# Patient Record
Sex: MALE | Race: WHITE
[De-identification: names, ages, dates, MRNs, and addresses within clinical notes are randomized per-mention and may not be internally consistent; named-entity substitution may affect disease eponyms.]

---

## 2018-02-11 ENCOUNTER — HOSPITAL ENCOUNTER (INPATIENT)
Dept: HOSPITAL 54 - ER | Age: 68
LOS: 2 days | Discharge: HOME | DRG: 537 | End: 2018-02-13
Attending: INTERNAL MEDICINE | Admitting: INTERNAL MEDICINE
Payer: MEDICARE

## 2018-02-11 VITALS — WEIGHT: 240 LBS | BODY MASS INDEX: 34.36 KG/M2 | HEIGHT: 70 IN

## 2018-02-11 VITALS — SYSTOLIC BLOOD PRESSURE: 122 MMHG | DIASTOLIC BLOOD PRESSURE: 68 MMHG

## 2018-02-11 DIAGNOSIS — F17.200: ICD-10-CM

## 2018-02-11 DIAGNOSIS — J44.9: ICD-10-CM

## 2018-02-11 DIAGNOSIS — Z79.4: ICD-10-CM

## 2018-02-11 DIAGNOSIS — E11.9: ICD-10-CM

## 2018-02-11 DIAGNOSIS — E66.9: ICD-10-CM
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
## 2018-02-11 DIAGNOSIS — S73.005A: Primary | ICD-10-CM

## 2018-02-11 DIAGNOSIS — F41.9: ICD-10-CM
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
## 2018-02-11 DIAGNOSIS — R65.10: ICD-10-CM

## 2018-02-11 DIAGNOSIS — E83.42: ICD-10-CM

## 2018-02-11 DIAGNOSIS — F32.9: ICD-10-CM
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
## 2018-02-11 DIAGNOSIS — I10: ICD-10-CM

## 2018-02-11 DIAGNOSIS — Z79.899: ICD-10-CM

## 2018-02-11 DIAGNOSIS — F10.10: ICD-10-CM
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
## 2018-02-11 LAB
ALBUMIN SERPL BCP-MCNC: 3.4 G/DL (ref 3.4–5)
ALP SERPL-CCNC: 126 U/L (ref 46–116)
ALT SERPL W P-5'-P-CCNC: 75 U/L (ref 12–78)
APTT PPP: 22 SEC (ref 23–34)
AST SERPL W P-5'-P-CCNC: 54 U/L (ref 15–37)
BASOPHILS # BLD AUTO: 0 /CMM (ref 0–0.2)
BASOPHILS NFR BLD AUTO: 0.2 % (ref 0–2)
BILIRUB DIRECT SERPL-MCNC: 0.2 MG/DL (ref 0–0.2)
BILIRUB SERPL-MCNC: 0.4 MG/DL (ref 0.2–1)
BUN SERPL-MCNC: 22 MG/DL (ref 7–18)
CALCIUM SERPL-MCNC: 9 MG/DL (ref 8.5–10.1)
CHLORIDE SERPL-SCNC: 105 MMOL/L (ref 98–107)
CO2 SERPL-SCNC: 31 MMOL/L (ref 21–32)
CREAT SERPL-MCNC: 0.9 MG/DL (ref 0.6–1.3)
EOSINOPHIL # BLD AUTO: 0.2 /CMM (ref 0–0.7)
EOSINOPHIL NFR BLD AUTO: 1.3 % (ref 0–6)
EOSINOPHIL NFR BLD MANUAL: 1 % (ref 0–4)
GLUCOSE SERPL-MCNC: 110 MG/DL (ref 74–106)
HCT VFR BLD AUTO: 40 % (ref 39–51)
HGB BLD-MCNC: 14.1 G/DL (ref 13.5–17.5)
INR PPP: 0.88 (ref 0.85–1.15)
LYMPHOCYTES NFR BLD AUTO: 1 /CMM (ref 0.8–4.8)
LYMPHOCYTES NFR BLD AUTO: 7.3 % (ref 20–44)
LYMPHOCYTES NFR BLD MANUAL: 10 % (ref 16–48)
MCH RBC QN AUTO: 33 PG (ref 26–33)
MCHC RBC AUTO-ENTMCNC: 35 G/DL (ref 31–36)
MCV RBC AUTO: 93 FL (ref 80–96)
MONOCYTES NFR BLD AUTO: 1.1 /CMM (ref 0.1–1.3)
MONOCYTES NFR BLD AUTO: 8.4 % (ref 2–12)
MONOCYTES NFR BLD MANUAL: 11 % (ref 0–11)
NEUTROPHILS # BLD AUTO: 11.2 /CMM (ref 1.8–8.9)
NEUTROPHILS NFR BLD AUTO: 82.8 % (ref 43–81)
NEUTS BAND NFR BLD MANUAL: 3 % (ref 0–5)
NEUTS SEG NFR BLD MANUAL: 75 % (ref 42–76)
PLATELET # BLD AUTO: 111 /CMM (ref 150–450)
POTASSIUM SERPL-SCNC: 3.6 MMOL/L (ref 3.5–5.1)
PROT SERPL-MCNC: 8 G/DL (ref 6.4–8.2)
RBC # BLD AUTO: 4.33 MIL/UL (ref 4.5–6)
RDW COEFFICIENT OF VARIATION: 12.4 (ref 11.5–15)
SODIUM SERPL-SCNC: 139 MMOL/L (ref 136–145)
TROPONIN I SERPL-MCNC: < 0.017 NG/ML (ref 0–0.06)
WBC NRBC COR # BLD AUTO: 13.5 K/UL (ref 4.3–11)

## 2018-02-11 PROCEDURE — 0SSBXZZ REPOSITION LEFT HIP JOINT, EXTERNAL APPROACH: ICD-10-PCS

## 2018-02-11 PROCEDURE — A4606 OXYGEN PROBE USED W OXIMETER: HCPCS

## 2018-02-11 PROCEDURE — Z7610: HCPCS

## 2018-02-11 NOTE — NUR
pt rec'd to er  via ems   lives at sober living  fac  . friends  called [id number]  
pt couldnt get up out of bathrrom    very dizzy .   this has happened befiore  
pt stated  oriented x2  . stted taking  beer with xanax 2 mg po  and  norco 325 
 cant remembe rhow many  . very dirty .  iv strted 18 left ac  labs drawn sent 
to lab./  pt use to use  herioin    just drinks now     pt fell aleeep  on  
monitor  or2 sats 84 ra  n/c 3l  AWAITING EVALUATION BY ER PROVIDER.

## 2018-02-11 NOTE — NUR
MS RN NOTES

RECEIVED REPORT FROM ER NURSE ED.PATIENT WAS BROUGHT UP TO THE UNIT ON A GURNEY. SLEEPING, 
EASILY AROUSED WITH VERBAL STIMULI. ORIENTED X3 -4. ON 2 L OF O2 VIA NASAL CANNULA. NO 
APPARENT DISTRESS OR DISCOMFORT NOTED AT THIS TIME. DENIES PAIN. LEFT AC HL #18, PATENT AND 
INTACT, FLASHED WITH NS. PATIENT WITH ABDUCTOR PILLOW AND LLE IMMOBILIZER. PATIENT WAS MADE 
COMFORTABLE IN BED. BELONGINGS AND VALUABLES IN PLACE PER INITIAL LIST. SAFETY MEASURES IN 
PLACE: ID BAND ON, BED IN LOW LOCKED POSITION WITH SIDE RAILS UP x2, CALL LIGHT WITHIN EASY 
REACH. WILL CONTINUE TO MONITOR.

## 2018-02-12 VITALS — SYSTOLIC BLOOD PRESSURE: 125 MMHG | DIASTOLIC BLOOD PRESSURE: 74 MMHG

## 2018-02-12 VITALS — DIASTOLIC BLOOD PRESSURE: 72 MMHG | SYSTOLIC BLOOD PRESSURE: 137 MMHG

## 2018-02-12 VITALS — DIASTOLIC BLOOD PRESSURE: 50 MMHG | SYSTOLIC BLOOD PRESSURE: 129 MMHG

## 2018-02-12 LAB
BASOPHILS # BLD AUTO: 0 /CMM (ref 0–0.2)
BASOPHILS NFR BLD AUTO: 0 % (ref 0–2)
BUN SERPL-MCNC: 10 MG/DL (ref 7–18)
CALCIUM SERPL-MCNC: 8.7 MG/DL (ref 8.5–10.1)
CHLORIDE SERPL-SCNC: 107 MMOL/L (ref 98–107)
CO2 SERPL-SCNC: 28 MMOL/L (ref 21–32)
CREAT SERPL-MCNC: 0.7 MG/DL (ref 0.6–1.3)
EOSINOPHIL # BLD AUTO: 0.1 /CMM (ref 0–0.7)
EOSINOPHIL NFR BLD AUTO: 0.8 % (ref 0–6)
GLUCOSE SERPL-MCNC: 296 MG/DL (ref 74–106)
HCT VFR BLD AUTO: 36 % (ref 39–51)
HGB BLD-MCNC: 12.8 G/DL (ref 13.5–17.5)
LYMPHOCYTES NFR BLD AUTO: 1 /CMM (ref 0.8–4.8)
LYMPHOCYTES NFR BLD AUTO: 7.4 % (ref 20–44)
MAGNESIUM SERPL-MCNC: 1.6 MG/DL (ref 1.8–2.4)
MCH RBC QN AUTO: 33 PG (ref 26–33)
MCHC RBC AUTO-ENTMCNC: 35 G/DL (ref 31–36)
MCV RBC AUTO: 94 FL (ref 80–96)
MONOCYTES NFR BLD AUTO: 0.9 /CMM (ref 0.1–1.3)
MONOCYTES NFR BLD AUTO: 7 % (ref 2–12)
NEUTROPHILS # BLD AUTO: 11.5 /CMM (ref 1.8–8.9)
NEUTROPHILS NFR BLD AUTO: 84.8 % (ref 43–81)
PHOSPHATE SERPL-MCNC: 2.4 MG/DL (ref 2.5–4.9)
PLATELET # BLD AUTO: 100 /CMM (ref 150–450)
POTASSIUM SERPL-SCNC: 4.1 MMOL/L (ref 3.5–5.1)
RBC # BLD AUTO: 3.86 MIL/UL (ref 4.5–6)
RDW COEFFICIENT OF VARIATION: 13.6 (ref 11.5–15)
SODIUM SERPL-SCNC: 142 MMOL/L (ref 136–145)
WBC NRBC COR # BLD AUTO: 13.6 K/UL (ref 4.3–11)

## 2018-02-12 RX ADMIN — Medication PRN MG: at 08:42

## 2018-02-12 RX ADMIN — MAGNESIUM SULFATE IN DEXTROSE SCH MLS/HR: 10 INJECTION, SOLUTION INTRAVENOUS at 13:45

## 2018-02-12 RX ADMIN — MAGNESIUM SULFATE IN DEXTROSE SCH MLS/HR: 10 INJECTION, SOLUTION INTRAVENOUS at 12:45

## 2018-02-12 RX ADMIN — ALPRAZOLAM SCH MG: 1 TABLET ORAL at 21:27

## 2018-02-12 RX ADMIN — Medication PRN MG: at 12:37

## 2018-02-12 RX ADMIN — Medication PRN MG: at 04:43

## 2018-02-12 NOTE — NUR
MS RN CLOSING NOTES

PATIENT AWAKE IN BED, ALERT ORIENTED X3 -4. ON 2 L OF O2 VIA NASAL CANNULA. NO APPARENT 
DISTRESS OR DISCOMFORT NOTED AT THIS TIME. DENIES PAIN. LEFT AC HL #18, PATENT AND INTACT. 
PATIENT WITH ABDUCTOR PILLOW AND LLE IMMOBILIZER. PATIENT KEPT CLEAN AND COMFORTABLE IN BED. 
ALL NEEDS ATTENDED. SAFETY MEASURES IN PLACE: ID BAND ON, BED IN LOW LOCKED POSITION WITH 
SIDE RAILS UP x2, CALL LIGHT WITHIN EASY REACH. WILL CONTINUE TO MONITOR.

## 2018-02-12 NOTE — NUR
Social service consult requested by Dr. Bocanegra for readmission score of 11 and pt. from sober 
living. SW referred pt. to  [facility] for [facility].

## 2018-02-12 NOTE — NUR
MS/RN   End note



Patient refusing all oral medications until Dr Naylor has entered home medications, 
patient continues to state that he is in pain and needs his regular medications. Patient 
also appears to be withdrawing from alcohol. Hip precautions reinforced, stated that he was 
aware.

Safety measures in place, call light within reach, will continue to monitor and endorse to 
night shift.

## 2018-02-12 NOTE — NUR
MS/RN   Pain medication



Complaining of pain to left hip 8/10, medication administered as ordered. Will monitor 
effectiveness.

## 2018-02-12 NOTE — NUR
RN NOTES



RECEIVED PT OUT OF BED, WALKING WITH FWW, VERY UPSET AND AGITATED  BECAUSE HIS MEDICATIONS 
WERE NOT ORDERED YET BY THE DOCTOR. THE PT EVEN THROW THE FWW TO HIS FRUSTRATION. SPOKE TO 
THE PT AND CALM HIM DOWN, TOLD HIM WILL CALL THE DOCTOR RIGHT NOW TO GET THE ORDER. ASSISTED 
PT BACK TO BED, AND WILL UPDATE WITH HIS MEDICATIONS.

## 2018-02-12 NOTE — NUR
RN NOTES



TRAZADONE 400MG PUT ON HOLD BECAUSE PT IS SO SLEEPY WITH XANAX AND OXYCODONE. WILL CONTINUE 
TO MONITOR PT.

## 2018-02-12 NOTE — NUR
MS/RN   Patient received



Patient received from night shift.

All needs attended, remains with abduction pillow to prevent hip from further dislocating. 
Left foot warm to touch with full sensation. 

Call light within [facility], will continue to monitor and ensure safety.

## 2018-02-12 NOTE — NUR
MS/RN   S/B Ortho



Seen by ortho - patient remains non compliant, stable from ortho stand point. Follow up with 
Dr Reddy at [facility].

## 2018-02-12 NOTE — NUR
MS/RN   Refused magnesium



Patient refusing magnesium replacement, stated that if he can't get his home medication then 
he doesn't want anything else. Will inform MD.

## 2018-02-13 VITALS — DIASTOLIC BLOOD PRESSURE: 88 MMHG | SYSTOLIC BLOOD PRESSURE: 127 MMHG

## 2018-02-13 VITALS — SYSTOLIC BLOOD PRESSURE: 137 MMHG | DIASTOLIC BLOOD PRESSURE: 72 MMHG

## 2018-02-13 VITALS — SYSTOLIC BLOOD PRESSURE: 127 MMHG | DIASTOLIC BLOOD PRESSURE: 88 MMHG

## 2018-02-13 LAB
BUN SERPL-MCNC: 10 MG/DL (ref 7–18)
CALCIUM SERPL-MCNC: 9.1 MG/DL (ref 8.5–10.1)
CHLORIDE SERPL-SCNC: 105 MMOL/L (ref 98–107)
CO2 SERPL-SCNC: 30 MMOL/L (ref 21–32)
CREAT SERPL-MCNC: 0.7 MG/DL (ref 0.6–1.3)
GLUCOSE SERPL-MCNC: 176 MG/DL (ref 74–106)
MAGNESIUM SERPL-MCNC: 1.6 MG/DL (ref 1.8–2.4)
PHOSPHATE SERPL-MCNC: 3 MG/DL (ref 2.5–4.9)
POTASSIUM SERPL-SCNC: 4.4 MMOL/L (ref 3.5–5.1)
SODIUM SERPL-SCNC: 143 MMOL/L (ref 136–145)

## 2018-02-13 RX ADMIN — ALPRAZOLAM SCH MG: 1 TABLET ORAL at 12:12

## 2018-02-13 RX ADMIN — MAGNESIUM SULFATE IN DEXTROSE SCH MLS/HR: 10 INJECTION, SOLUTION INTRAVENOUS at 11:12

## 2018-02-13 RX ADMIN — MAGNESIUM SULFATE IN DEXTROSE SCH MLS/HR: 10 INJECTION, SOLUTION INTRAVENOUS at 12:12

## 2018-02-13 RX ADMIN — ALPRAZOLAM SCH MG: 1 TABLET ORAL at 09:00

## 2018-02-13 RX ADMIN — Medication SCH MG: at 08:56

## 2018-02-13 RX ADMIN — CYCLOBENZAPRINE HYDROCHLORIDE SCH MG: 10 TABLET, FILM COATED ORAL at 08:17

## 2018-02-13 RX ADMIN — Medication SCH MG: at 12:12

## 2018-02-13 RX ADMIN — CYCLOBENZAPRINE HYDROCHLORIDE SCH MG: 10 TABLET, FILM COATED ORAL at 12:11

## 2018-02-13 NOTE — NUR
RN NOTES



PT REFUSING FOR ABDUCTOR PILLOW ON WHILE IN BED AND CAN WALK WITH OUT FWW WITHOUT PAIN. 
VERBALIZING WANTING TO BE DISCHARGE HOME. NO SIGNIFICANT CHANGE IN CONDITION NOTED. PAIN 
CONTROLLED. ENDORSED TO MORNING RN FOR CONTINUITY OF CARE.

## 2018-02-13 NOTE — NUR
MS/RN   S/B Carlton Decker



Seen by Carlton Decker - patient to be discharged to home today and follow up with ortho. No 
prescriptions needed.

## 2018-02-13 NOTE — NUR
MS/RN   Discharge



Patient discharged to home in stable condition.

All personal belongings signed for on personal property list. Education provided to patient 
regarding the importance of following up with ortho MD at [facility], also 
concerning hip precautions, patient stated understanding of both.

Escorted to main lobby by CNA, son providing transport.



Both heplock and name bands already removed.

## 2018-02-13 NOTE — NUR
MS/RN   



Seen by  - patient again stating wish to be discharged to home today and not to 
rehab. Stated that he has all equipment needed at home from previous surgery.

## 2018-02-13 NOTE — NUR
MS/RN   Patient received



Patient received from night shift.

Unhappy about care received by MD, stating that he had no right to withhold his medications 
from him. Expressed that he would be leaving today, and would arrange his own rehab.

Safety measures in placed, attempted to reinforce hip precautions, but refusing to listen.

Will continue to monitor and ensure safety.

## 2021-03-07 ENCOUNTER — HOSPITAL ENCOUNTER (INPATIENT)
Dept: HOSPITAL 54 - ER | Age: 71
LOS: 10 days | Discharge: NURSING, REHAB OR LONG-TERM CARE | DRG: 468 | End: 2021-03-17
Attending: INTERNAL MEDICINE | Admitting: INTERNAL MEDICINE
Payer: MEDICARE

## 2021-03-07 VITALS — DIASTOLIC BLOOD PRESSURE: 117 MMHG | SYSTOLIC BLOOD PRESSURE: 169 MMHG

## 2021-03-07 VITALS — WEIGHT: 189 LBS | HEIGHT: 70 IN | BODY MASS INDEX: 27.06 KG/M2

## 2021-03-07 DIAGNOSIS — I10: ICD-10-CM

## 2021-03-07 DIAGNOSIS — E11.65: ICD-10-CM

## 2021-03-07 DIAGNOSIS — G89.29: ICD-10-CM

## 2021-03-07 DIAGNOSIS — N40.0: ICD-10-CM

## 2021-03-07 DIAGNOSIS — T84.021A: Primary | ICD-10-CM
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
## 2021-03-07 DIAGNOSIS — F17.210: ICD-10-CM

## 2021-03-07 DIAGNOSIS — Z20.822: ICD-10-CM

## 2021-03-07 DIAGNOSIS — E78.5: ICD-10-CM
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
## 2021-03-07 DIAGNOSIS — Z79.84: ICD-10-CM

## 2021-03-07 DIAGNOSIS — Z79.891: ICD-10-CM

## 2021-03-07 LAB
BASOPHILS # BLD AUTO: 0.1 /CMM (ref 0–0.2)
BASOPHILS NFR BLD AUTO: 0.8 % (ref 0–2)
BUN SERPL-MCNC: 14 MG/DL (ref 7–18)
CALCIUM SERPL-MCNC: 8.8 MG/DL (ref 8.5–10.1)
CHLORIDE SERPL-SCNC: 98 MMOL/L (ref 98–107)
CO2 SERPL-SCNC: 30 MMOL/L (ref 21–32)
CREAT SERPL-MCNC: 1.1 MG/DL (ref 0.6–1.3)
EOSINOPHIL NFR BLD AUTO: 3.6 % (ref 0–6)
EOSINOPHIL NFR BLD MANUAL: 2 % (ref 0–4)
GLUCOSE SERPL-MCNC: 459 MG/DL (ref 74–106)
HCT VFR BLD AUTO: 40 % (ref 39–51)
HGB BLD-MCNC: 13.7 G/DL (ref 13.5–17.5)
LYMPHOCYTES NFR BLD AUTO: 1.6 /CMM (ref 0.8–4.8)
LYMPHOCYTES NFR BLD AUTO: 23.2 % (ref 20–44)
LYMPHOCYTES NFR BLD MANUAL: 22 % (ref 16–48)
MCHC RBC AUTO-ENTMCNC: 34 G/DL (ref 31–36)
MCV RBC AUTO: 94 FL (ref 80–96)
MONOCYTES NFR BLD AUTO: 0.6 /CMM (ref 0.1–1.3)
MONOCYTES NFR BLD AUTO: 8.7 % (ref 2–12)
MONOCYTES NFR BLD MANUAL: 7 % (ref 0–11)
NEUTROPHILS # BLD AUTO: 4.5 /CMM (ref 1.8–8.9)
NEUTROPHILS NFR BLD AUTO: 63.7 % (ref 43–81)
NEUTS SEG NFR BLD MANUAL: 69 % (ref 42–76)
PLATELET # BLD AUTO: 84 /CMM (ref 150–450)
POTASSIUM SERPL-SCNC: 4.3 MMOL/L (ref 3.5–5.1)
RBC # BLD AUTO: 4.28 MIL/UL (ref 4.5–6)
SODIUM SERPL-SCNC: 132 MMOL/L (ref 136–145)
WBC NRBC COR # BLD AUTO: 7.1 K/UL (ref 4.3–11)

## 2021-03-07 PROCEDURE — A4217 STERILE WATER/SALINE, 500 ML: HCPCS

## 2021-03-07 PROCEDURE — 0SJBXZZ INSPECTION OF LEFT HIP JOINT, EXTERNAL APPROACH: ICD-10-PCS | Performed by: SPECIALIST

## 2021-03-07 PROCEDURE — G0378 HOSPITAL OBSERVATION PER HR: HCPCS

## 2021-03-07 PROCEDURE — G0500 MOD SEDAT ENDO SERVICE >5YRS: HCPCS

## 2021-03-07 PROCEDURE — A6209 FOAM DRSG <=16 SQ IN W/O BDR: HCPCS

## 2021-03-07 PROCEDURE — C9803 HOPD COVID-19 SPEC COLLECT: HCPCS

## 2021-03-07 RX ADMIN — ALPRAZOLAM SCH MG: 1 TABLET ORAL at 17:13

## 2021-03-07 RX ADMIN — CYCLOBENZAPRINE HYDROCHLORIDE SCH MG: 10 TABLET, FILM COATED ORAL at 17:13

## 2021-03-07 RX ADMIN — Medication SCH EACH: at 18:28

## 2021-03-07 RX ADMIN — TAMSULOSIN HYDROCHLORIDE SCH MG: 0.4 CAPSULE ORAL at 22:28

## 2021-03-07 RX ADMIN — TAMSULOSIN HYDROCHLORIDE SCH MG: 0.4 CAPSULE ORAL at 22:00

## 2021-03-07 RX ADMIN — Medication PRN MG: at 16:47

## 2021-03-07 RX ADMIN — Medication SCH EACH: at 22:18

## 2021-03-07 RX ADMIN — Medication SCH MG: at 22:29

## 2021-03-07 RX ADMIN — ATORVASTATIN CALCIUM SCH MG: 10 TABLET, FILM COATED ORAL at 22:29

## 2021-03-07 RX ADMIN — ATORVASTATIN CALCIUM SCH MG: 10 TABLET, FILM COATED ORAL at 22:00

## 2021-03-07 RX ADMIN — HUMAN INSULIN PRN UNIT: 100 INJECTION, SOLUTION SUBCUTANEOUS at 22:28

## 2021-03-07 RX ADMIN — TRAZODONE HYDROCHLORIDE SCH MG: 50 TABLET ORAL at 22:00

## 2021-03-07 RX ADMIN — INSULIN HUMAN PRN UNIT: 100 INJECTION, SOLUTION PARENTERAL at 18:36

## 2021-03-07 NOTE — NUR
oxcontin held pt is arousavble to touch but falling asleep mid conversations. rr is 14. will 
cont to monito.r

## 2021-03-07 NOTE — NUR
PT [id number] GLF LEFT LEG "GIVES IN" & FELL PER EMS REPORT, -KO. PT IS AAOX4, 
NOT IN RESPIRATORY DISTRESS, HOOKED TO CARDIAC MONITOR, KEPT RESTED AND 
COMFORTABLE. WILL CONTINUE TO MONITOR.

## 2021-03-08 VITALS — DIASTOLIC BLOOD PRESSURE: 90 MMHG | SYSTOLIC BLOOD PRESSURE: 148 MMHG

## 2021-03-08 VITALS — SYSTOLIC BLOOD PRESSURE: 107 MMHG | DIASTOLIC BLOOD PRESSURE: 68 MMHG

## 2021-03-08 VITALS — SYSTOLIC BLOOD PRESSURE: 166 MMHG | DIASTOLIC BLOOD PRESSURE: 83 MMHG

## 2021-03-08 LAB
BASOPHILS # BLD AUTO: 0 /CMM (ref 0–0.2)
BASOPHILS NFR BLD AUTO: 0.6 % (ref 0–2)
BUN SERPL-MCNC: 6 MG/DL (ref 7–18)
CALCIUM SERPL-MCNC: 8.9 MG/DL (ref 8.5–10.1)
CHLORIDE SERPL-SCNC: 102 MMOL/L (ref 98–107)
CO2 SERPL-SCNC: 29 MMOL/L (ref 21–32)
CREAT SERPL-MCNC: 0.7 MG/DL (ref 0.6–1.3)
EOSINOPHIL NFR BLD AUTO: 3 % (ref 0–6)
GLUCOSE SERPL-MCNC: 210 MG/DL (ref 74–106)
HCT VFR BLD AUTO: 43 % (ref 39–51)
HGB BLD-MCNC: 14.5 G/DL (ref 13.5–17.5)
LYMPHOCYTES NFR BLD AUTO: 1.6 /CMM (ref 0.8–4.8)
LYMPHOCYTES NFR BLD AUTO: 25.5 % (ref 20–44)
MAGNESIUM SERPL-MCNC: 1.8 MG/DL (ref 1.8–2.4)
MCHC RBC AUTO-ENTMCNC: 34 G/DL (ref 31–36)
MCV RBC AUTO: 94 FL (ref 80–96)
MONOCYTES NFR BLD AUTO: 0.5 /CMM (ref 0.1–1.3)
MONOCYTES NFR BLD AUTO: 8.4 % (ref 2–12)
NEUTROPHILS # BLD AUTO: 3.9 /CMM (ref 1.8–8.9)
NEUTROPHILS NFR BLD AUTO: 62.5 % (ref 43–81)
PHOSPHATE SERPL-MCNC: 3.2 MG/DL (ref 2.5–4.9)
PLATELET # BLD AUTO: 82 /CMM (ref 150–450)
POTASSIUM SERPL-SCNC: 4.2 MMOL/L (ref 3.5–5.1)
RBC # BLD AUTO: 4.54 MIL/UL (ref 4.5–6)
SODIUM SERPL-SCNC: 137 MMOL/L (ref 136–145)
WBC NRBC COR # BLD AUTO: 6.3 K/UL (ref 4.3–11)

## 2021-03-08 RX ADMIN — Medication SCH EACH: at 17:34

## 2021-03-08 RX ADMIN — TRAZODONE HYDROCHLORIDE SCH MG: 50 TABLET ORAL at 21:41

## 2021-03-08 RX ADMIN — HYDROMORPHONE HYDROCHLORIDE PRN MG: 2 INJECTION, SOLUTION INTRAMUSCULAR; INTRAVENOUS; SUBCUTANEOUS at 12:37

## 2021-03-08 RX ADMIN — CYCLOBENZAPRINE HYDROCHLORIDE SCH MG: 10 TABLET, FILM COATED ORAL at 13:00

## 2021-03-08 RX ADMIN — ATORVASTATIN CALCIUM SCH MG: 10 TABLET, FILM COATED ORAL at 21:41

## 2021-03-08 RX ADMIN — TAMSULOSIN HYDROCHLORIDE SCH MG: 0.4 CAPSULE ORAL at 21:41

## 2021-03-08 RX ADMIN — Medication SCH MG: at 13:00

## 2021-03-08 RX ADMIN — PANTOPRAZOLE SODIUM SCH MG: 40 TABLET, DELAYED RELEASE ORAL at 07:21

## 2021-03-08 RX ADMIN — CYCLOBENZAPRINE HYDROCHLORIDE SCH MG: 10 TABLET, FILM COATED ORAL at 17:00

## 2021-03-08 RX ADMIN — HYDROMORPHONE HYDROCHLORIDE PRN MG: 2 INJECTION, SOLUTION INTRAMUSCULAR; INTRAVENOUS; SUBCUTANEOUS at 15:25

## 2021-03-08 RX ADMIN — Medication SCH EACH: at 13:33

## 2021-03-08 RX ADMIN — ALPRAZOLAM SCH MG: 1 TABLET ORAL at 17:04

## 2021-03-08 RX ADMIN — INSULIN HUMAN PRN UNIT: 100 INJECTION, SOLUTION PARENTERAL at 17:36

## 2021-03-08 RX ADMIN — Medication SCH EACH: at 21:41

## 2021-03-08 RX ADMIN — Medication SCH EACH: at 07:21

## 2021-03-08 RX ADMIN — ALPRAZOLAM SCH MG: 1 TABLET ORAL at 09:00

## 2021-03-08 RX ADMIN — ALPRAZOLAM SCH MG: 1 TABLET ORAL at 13:00

## 2021-03-08 RX ADMIN — LISINOPRIL SCH MG: 10 TABLET ORAL at 09:00

## 2021-03-08 RX ADMIN — Medication SCH MG: at 21:00

## 2021-03-08 RX ADMIN — Medication SCH MG: at 05:00

## 2021-03-08 RX ADMIN — CYCLOBENZAPRINE HYDROCHLORIDE SCH MG: 10 TABLET, FILM COATED ORAL at 09:00

## 2021-03-08 RX ADMIN — HYDROMORPHONE HYDROCHLORIDE PRN MG: 2 INJECTION, SOLUTION INTRAMUSCULAR; INTRAVENOUS; SUBCUTANEOUS at 09:08

## 2021-03-08 RX ADMIN — Medication PRN MG: at 06:22

## 2021-03-08 NOTE — NUR
SS CONSULT FOLLOW UP: 

Per RN, Dorian lorenzana is in surgery and unable to meet for an assessment at this time. RN will 
follow up with SS when the patient is alert and able to complete an assessment.

## 2021-03-08 NOTE — NUR
RN OPENING NOTE



PT IS RESTING BED. AROUSES TO LIGHT TOUCH. A/O X3 AND ABLE TO COMMUNICATE NEEDS TO NURSES. 
NO CURRENT COMPLAINT OF PAIN. NO NAUSEA. CURRENTLY ON RA. NO SOB OR RESPIRATORY DISTRESS 
PRESENT. V/S STABLE. CURRENTLY ON BEDREST. L HIP DISLOCATION PRESENT. HL PRESENT ON R FA, 
22G AND FLUSHES WELL. SAFETY MEASURES IN PLACE. SIDE RAILS RAISED, BED LOWERED, CALL LIGHT 
WITHIN REACH. WILL CONTINUE TO MONITOR.

## 2021-03-08 NOTE — NUR
held oxcontin again this am held pt is arousable to touch and voice but still exhibits some 
lethargy. pt  groans when moving but when still/still not mvoing,  in no apparent distress. 
pt continues to reposition self. will cont to monitor.

## 2021-03-08 NOTE — NUR
RN CLOSING NOTE



PT IS RESTING IN BED AWAKE. A/O X 3 AND ABLE TO COMMUNICATE NEEDS TO NURSES. NO COMPLAINT OF 
PAIN. NO NAUSEA. CURRENTLY ON RA. NO SOB OR RESPIRATORY DISTRESS PRESENT. CURRENTLY ON 
BEDREST DUE TO HIP DISLOCATION. ABLE TO USE URINAL/BEDPAN. SKIN IS INTACT. HL PRESENT ON R 
AC, 18 G AND FLUSHES WELL. ROUTINE MEDS GIVEN. SAFETY MEASURES IN PLACE. SIDE RAILS UP. BED 
LOWERED. CALL LIGHT WITHIN REACH. REPORT TO BE GIVEN TO NIGHT NURSE FOR ADRIANNE.

## 2021-03-08 NOTE — NUR
RN CLOSING NOTE

PT IN BED NO ACUTE DISTRESS NOTED. PT SLEPT 8HRS LAST NIGHT. NARCOTIC MEDICATION HELD D/T PT 
APPEARING TO FALL ASLEEP DURING CONVERSATIONS. PT STILL COMPLAINING OF SEVERE PAIN TO LEFT 
HIP AND HIP IS VISIBLY OUT OF ALIGNMENT. PT REFUSED 2200 MEDICATIONS. PT MOVING 
INDEPENDENTLY IN BED FROM SIDE TO SIDE. ATTITUDE IS ANGRY AND NON COMPLIANT DURING 
INTERACTION WHILE AWAKE. GIVEN ONE DOSE OF MORPHINE 2MG AROUND 6PM. SAFETY MEASURE IN PLACE, 
BED DOWN LOCKED SRX3 CALL LIGHT IN REACH. WILL ENDORSE TO ONCOMING SHIFT.

## 2021-03-08 NOTE — NUR
pt refused am  accucheck. patient refused am labs by . pt upset, states, "why 
wont you guys fix my hip. you havent done a goddamn thing.All you want is blood this and 
blood that. fix my hip god damnit!"

## 2021-03-08 NOTE — NUR
RN NOTE



PT HAS HIP OPERATIONS DONE AT [place] AND [facility]. UNABLE TO OBTAIN NAME OF ORIGINAL 
SURGEON. WILL ENDORSE TO NIGHT NURSE.

## 2021-03-08 NOTE — NUR
PATIENT REFUSED SCHEDULED MEDICATIONS. STATES, "I DON'T WANT ANY OF THAT STUFF, I WANT THEM 
TO FIX MY [place] HIP. STATES A DR. ASHLEY DID HIS HIP SURGERY AT [place]. PATIENT 
REPORTS PAIN TO LEFT HIP 10/10 BUT DURING CONVERSATION PATIENT APPEARED TO FALL BACK ASLEEP 
WITH EYES CLOSED AND SOFT SNORING. RR IS 18 PT AROUSABLE TO VOICE BUT SOMEWHAT LETHARGIC 
WILL HOLD OFF ON ADMINISTERING NARCOTICS UNTIL PATIENT MORE ALERT WILL CONT TO MONITOR.

## 2021-03-08 NOTE — NUR
RN POST OP NOTE



CLOSED REDUCTION OF LEFT HIP FRACTURE UNSUCCESSFUL. RESUME PRE OP ORDERS AS INDICATED. 
ADVANCE DIET AS TOLERATED. /83, O2 SAT 99% ON 2L NC, 99 OH, 98.6F TEMPERATURE. WILL 
CONTINUE TO MONITOR.

## 2021-03-09 VITALS — DIASTOLIC BLOOD PRESSURE: 62 MMHG | SYSTOLIC BLOOD PRESSURE: 90 MMHG

## 2021-03-09 VITALS — SYSTOLIC BLOOD PRESSURE: 125 MMHG | DIASTOLIC BLOOD PRESSURE: 74 MMHG

## 2021-03-09 VITALS — SYSTOLIC BLOOD PRESSURE: 102 MMHG | DIASTOLIC BLOOD PRESSURE: 69 MMHG

## 2021-03-09 LAB
ALBUMIN SERPL BCP-MCNC: 2.5 G/DL (ref 3.4–5)
ALP SERPL-CCNC: 171 U/L (ref 46–116)
ALT SERPL W P-5'-P-CCNC: 82 U/L (ref 12–78)
AST SERPL W P-5'-P-CCNC: 58 U/L (ref 15–37)
BASOPHILS # BLD AUTO: 0.1 /CMM (ref 0–0.2)
BASOPHILS NFR BLD AUTO: 0.3 % (ref 0–2)
BILIRUB SERPL-MCNC: 0.7 MG/DL (ref 0.2–1)
BUN SERPL-MCNC: 14 MG/DL (ref 7–18)
CALCIUM SERPL-MCNC: 8.9 MG/DL (ref 8.5–10.1)
CHLORIDE SERPL-SCNC: 100 MMOL/L (ref 98–107)
CHOLEST SERPL-MCNC: 129 MG/DL (ref ?–200)
CO2 SERPL-SCNC: 36 MMOL/L (ref 21–32)
CREAT SERPL-MCNC: 0.9 MG/DL (ref 0.6–1.3)
EOSINOPHIL NFR BLD AUTO: 0.5 % (ref 0–6)
EOSINOPHIL NFR BLD MANUAL: 1 % (ref 0–4)
GLUCOSE SERPL-MCNC: 227 MG/DL (ref 74–106)
HCT VFR BLD AUTO: 41 % (ref 39–51)
HDLC SERPL-MCNC: 53 MG/DL (ref 40–60)
HGB BLD-MCNC: 14 G/DL (ref 13.5–17.5)
LDLC SERPL DIRECT ASSAY-MCNC: 65 MG/DL (ref 0–99)
LYMPHOCYTES NFR BLD AUTO: 1.8 /CMM (ref 0.8–4.8)
LYMPHOCYTES NFR BLD AUTO: 10.7 % (ref 20–44)
LYMPHOCYTES NFR BLD MANUAL: 9 % (ref 16–48)
MAGNESIUM SERPL-MCNC: 2 MG/DL (ref 1.8–2.4)
MCHC RBC AUTO-ENTMCNC: 34 G/DL (ref 31–36)
MCV RBC AUTO: 93 FL (ref 80–96)
MONOCYTES NFR BLD AUTO: 1.4 /CMM (ref 0.1–1.3)
MONOCYTES NFR BLD AUTO: 8.2 % (ref 2–12)
MONOCYTES NFR BLD MANUAL: 7 % (ref 0–11)
NEUTROPHILS # BLD AUTO: 13.6 /CMM (ref 1.8–8.9)
NEUTROPHILS NFR BLD AUTO: 80.3 % (ref 43–81)
NEUTS BAND NFR BLD MANUAL: 3 % (ref 0–5)
NEUTS SEG NFR BLD MANUAL: 80 % (ref 42–76)
PHOSPHATE SERPL-MCNC: 3.6 MG/DL (ref 2.5–4.9)
PLATELET # BLD AUTO: 94 /CMM (ref 150–450)
POTASSIUM SERPL-SCNC: 4.2 MMOL/L (ref 3.5–5.1)
PROT SERPL-MCNC: 8 G/DL (ref 6.4–8.2)
RBC # BLD AUTO: 4.4 MIL/UL (ref 4.5–6)
SODIUM SERPL-SCNC: 136 MMOL/L (ref 136–145)
TRIGL SERPL-MCNC: 48 MG/DL (ref 30–150)
WBC NRBC COR # BLD AUTO: 16.9 K/UL (ref 4.3–11)

## 2021-03-09 RX ADMIN — CYCLOBENZAPRINE HYDROCHLORIDE SCH MG: 10 TABLET, FILM COATED ORAL at 12:52

## 2021-03-09 RX ADMIN — ATORVASTATIN CALCIUM SCH MG: 10 TABLET, FILM COATED ORAL at 22:02

## 2021-03-09 RX ADMIN — Medication SCH MG: at 22:02

## 2021-03-09 RX ADMIN — Medication SCH EACH: at 22:04

## 2021-03-09 RX ADMIN — TAMSULOSIN HYDROCHLORIDE SCH MG: 0.4 CAPSULE ORAL at 22:02

## 2021-03-09 RX ADMIN — HYDROMORPHONE HYDROCHLORIDE PRN MG: 2 INJECTION, SOLUTION INTRAMUSCULAR; INTRAVENOUS; SUBCUTANEOUS at 05:28

## 2021-03-09 RX ADMIN — PANTOPRAZOLE SODIUM SCH MG: 40 TABLET, DELAYED RELEASE ORAL at 08:14

## 2021-03-09 RX ADMIN — HUMAN INSULIN PRN UNIT: 100 INJECTION, SOLUTION SUBCUTANEOUS at 22:13

## 2021-03-09 RX ADMIN — TRAZODONE HYDROCHLORIDE SCH MG: 50 TABLET ORAL at 22:04

## 2021-03-09 RX ADMIN — INSULIN HUMAN PRN UNIT: 100 INJECTION, SOLUTION PARENTERAL at 11:59

## 2021-03-09 RX ADMIN — CYCLOBENZAPRINE HYDROCHLORIDE SCH MG: 10 TABLET, FILM COATED ORAL at 08:14

## 2021-03-09 RX ADMIN — Medication SCH MG: at 12:54

## 2021-03-09 RX ADMIN — INSULIN HUMAN PRN UNIT: 100 INJECTION, SOLUTION PARENTERAL at 07:12

## 2021-03-09 RX ADMIN — Medication SCH EACH: at 07:18

## 2021-03-09 RX ADMIN — Medication SCH EACH: at 17:32

## 2021-03-09 RX ADMIN — HYDROMORPHONE HYDROCHLORIDE PRN MG: 2 INJECTION, SOLUTION INTRAMUSCULAR; INTRAVENOUS; SUBCUTANEOUS at 08:51

## 2021-03-09 RX ADMIN — HYDROMORPHONE HYDROCHLORIDE PRN MG: 2 INJECTION, SOLUTION INTRAMUSCULAR; INTRAVENOUS; SUBCUTANEOUS at 17:27

## 2021-03-09 RX ADMIN — Medication SCH MG: at 05:00

## 2021-03-09 RX ADMIN — LISINOPRIL SCH MG: 10 TABLET ORAL at 09:00

## 2021-03-09 RX ADMIN — HYDROMORPHONE HYDROCHLORIDE PRN MG: 2 INJECTION, SOLUTION INTRAMUSCULAR; INTRAVENOUS; SUBCUTANEOUS at 01:41

## 2021-03-09 RX ADMIN — HUMAN INSULIN PRN UNIT: 100 INJECTION, SOLUTION SUBCUTANEOUS at 22:09

## 2021-03-09 RX ADMIN — ALPRAZOLAM SCH MG: 1 TABLET ORAL at 08:14

## 2021-03-09 RX ADMIN — CYCLOBENZAPRINE HYDROCHLORIDE SCH MG: 10 TABLET, FILM COATED ORAL at 17:27

## 2021-03-09 RX ADMIN — INSULIN HUMAN PRN UNIT: 100 INJECTION, SOLUTION PARENTERAL at 17:31

## 2021-03-09 RX ADMIN — ALPRAZOLAM SCH MG: 1 TABLET ORAL at 12:58

## 2021-03-09 RX ADMIN — ALPRAZOLAM SCH MG: 1 TABLET ORAL at 17:00

## 2021-03-09 RX ADMIN — Medication SCH EACH: at 12:49

## 2021-03-09 NOTE — NUR
MS/RN CLOSING NOTES

PATIENT IS ON BED. ALERT AND ORIENTED X 3. PATIENT IS ON ROOM AIR SATURATION 93%. PATIENT IN 
NO APPARENT RESPIRATORY DISTRESS NOTED. NO COMPLAINED OF PAIN NOTED AT THIS TIME. SEEN AND 
EXAMINED BY MD WITH ORDERS MADE AND CARRIED OUT. ALL DUE MEDICATIONS WAS GIVEN. SAFETY 
PRECAUTIONS WAS IN PLACED. BED IN LOWEST POSITION AND LOCKED. WILL ENDORSED TO NIGHT SHIFT 
FOR ADRIANNE.

## 2021-03-09 NOTE — NUR
MS/RN OPENING NOTES

RECEIVED PATIENT ON BED AWAKE ALERT AND ORIENTED X3. PATIENT IS ON ROOM AIR SATURATING WELL. 
PATIENT IN NO APPARENT RESPIRATORY DISTRESS NOTED. NO COMPLAINED OF PAIN NOTED AT THIS TIME. 
WILL CONTINUE TO MONITOR.

## 2021-03-09 NOTE — NUR
RN NOTE



PATIENT IN BED RESTING, LETHARGIC. EASILY AROUSED. NO SOB OR ANY RESPIRATORY DISTRESS. ON 
ROOM AIR, O2 SAT WNL. DENIES ANY PAIN AT THIS TIME. WITH RIGHT AC #18 PATENT AND INTACT. ALL 
NEEDS ANTICIPATED. SAFETY MEASURES IN PLACE. BED LOCKED AND IN LOWEST POSITION. CALL LIGHT 
WITHIN REACH. WILL CONTINUE TO MONITOR.

## 2021-03-10 VITALS — SYSTOLIC BLOOD PRESSURE: 124 MMHG | DIASTOLIC BLOOD PRESSURE: 84 MMHG

## 2021-03-10 VITALS — DIASTOLIC BLOOD PRESSURE: 60 MMHG | SYSTOLIC BLOOD PRESSURE: 138 MMHG

## 2021-03-10 VITALS — DIASTOLIC BLOOD PRESSURE: 64 MMHG | SYSTOLIC BLOOD PRESSURE: 100 MMHG

## 2021-03-10 RX ADMIN — Medication SCH EACH: at 06:53

## 2021-03-10 RX ADMIN — LISINOPRIL SCH MG: 10 TABLET ORAL at 09:00

## 2021-03-10 RX ADMIN — TAMSULOSIN HYDROCHLORIDE SCH MG: 0.4 CAPSULE ORAL at 21:14

## 2021-03-10 RX ADMIN — Medication SCH EACH: at 11:42

## 2021-03-10 RX ADMIN — Medication SCH MG: at 12:12

## 2021-03-10 RX ADMIN — ALPRAZOLAM SCH MG: 1 TABLET ORAL at 09:05

## 2021-03-10 RX ADMIN — CYCLOBENZAPRINE HYDROCHLORIDE SCH MG: 10 TABLET, FILM COATED ORAL at 16:59

## 2021-03-10 RX ADMIN — CYCLOBENZAPRINE HYDROCHLORIDE SCH MG: 10 TABLET, FILM COATED ORAL at 12:12

## 2021-03-10 RX ADMIN — INSULIN HUMAN PRN UNIT: 100 INJECTION, SOLUTION PARENTERAL at 17:40

## 2021-03-10 RX ADMIN — ATORVASTATIN CALCIUM SCH MG: 10 TABLET, FILM COATED ORAL at 21:15

## 2021-03-10 RX ADMIN — Medication SCH EACH: at 17:39

## 2021-03-10 RX ADMIN — ALPRAZOLAM SCH MG: 1 TABLET ORAL at 12:12

## 2021-03-10 RX ADMIN — CYCLOBENZAPRINE HYDROCHLORIDE SCH MG: 10 TABLET, FILM COATED ORAL at 09:05

## 2021-03-10 RX ADMIN — INSULIN HUMAN PRN UNIT: 100 INJECTION, SOLUTION PARENTERAL at 11:44

## 2021-03-10 RX ADMIN — HUMAN INSULIN PRN UNIT: 100 INJECTION, SOLUTION SUBCUTANEOUS at 21:22

## 2021-03-10 RX ADMIN — PANTOPRAZOLE SODIUM SCH MG: 40 TABLET, DELAYED RELEASE ORAL at 09:05

## 2021-03-10 RX ADMIN — TRAZODONE HYDROCHLORIDE SCH MG: 50 TABLET ORAL at 21:02

## 2021-03-10 RX ADMIN — Medication SCH EACH: at 21:19

## 2021-03-10 RX ADMIN — ALPRAZOLAM SCH MG: 1 TABLET ORAL at 16:59

## 2021-03-10 RX ADMIN — INSULIN HUMAN PRN UNIT: 100 INJECTION, SOLUTION PARENTERAL at 06:53

## 2021-03-10 RX ADMIN — Medication SCH MG: at 20:57

## 2021-03-10 RX ADMIN — Medication SCH MG: at 05:00

## 2021-03-10 NOTE — NUR
RN notes

Pt refused desyrel 50 mg/8 tabs/po. Explained risks and benefits. Pt keep refusing. Will 
continue to monitor.

## 2021-03-10 NOTE — NUR
MS RN CLOSING NOTES



PATIENT RESTING IN BED, LETHARGIC. ON ROOM AIR, TOLERATING WELL. NO SOB NOTED. NO S/S OF 
RESPIRATORY DISTRESS. IV ACCESS ON  R AC #18 G, INTACT AND PATENT. ALL NEEDS HAVE BEEN MET. 
ROUTINE MEDS WERE GIVEN AS ORDERED. SAFETY MEASURES MAINTAINED. BED IN LOWEST POSITION, 
BRAKES LOCKED. SIDE RAILS UP X2. CALL LIGHT WITHIN REACH. WILL ENDORSE TO NIGHT SHIFT FOR 
CONTINUITY OF CARE.

## 2021-03-10 NOTE — NUR
RN NOTE



PATIENT IN BED RESTING, LETHARGIC. EASILY AROUSED. NO SOB OR ANY RESPIRATORY DISTRESS. ON 
ROOM AIR, O2 SAT WNL.  WITH RIGHT AC #18 PATENT AND INTACT. OFFERED AM MEDS AND ACCUCHECK 
X3, RISKS AND BENEFITS EXPLAINED, BUT STILL STRONGLY REFUSED AND ANGRILY SAID "NO".  SAFETY 
MEASURES IN PLACE. BED LOCKED AND IN LOWEST POSITION. CALL LIGHT WITHIN REACH. WILL ENDORSE 
TO ONCOMING SHIFT.

## 2021-03-10 NOTE — NUR
RN opening notes

Pt is resting in bed comfortably. Pt is alert and orientedX3, lethargic and easily 
arousable. Respiration is normal in room air. No SOB. No S/S of distress noted. IV site at 
RAc# 18 is clean, intact, flushes well and SL. Safety precautions is maintained. Bed at low 
position, brakes locked, side rails upX2, urinal at the bed side and call light is within 
reach. Will continue to monitor.

## 2021-03-10 NOTE — NUR
MS CISSE OPENING NOTES



RECEIVED PATIENT IN BED, SLEEPING. ON ROOM AIR, TOLERATING WELL. NO SOB NOTED. IN NO 
APPARENT DISTRESS. IV ACCESS ON  R AC #18 G, INTACT. SAFETY MEASURES MAINTAINED. BED IN 
LOWEST POSITION, BRAKES LOCKED. SIDE RAILS UP X2. CALL LIGHT WITHIN REACH. WILL CONTINUE 
PLAN OF CARE.

-------------------------------------------------------------------------------

Addendum: 03/10/21 at 0815 by ROSIO PAULA RN

-------------------------------------------------------------------------------

ENTERED BY JAYY SHEPPARDN, RN

## 2021-03-11 VITALS — DIASTOLIC BLOOD PRESSURE: 77 MMHG | SYSTOLIC BLOOD PRESSURE: 129 MMHG

## 2021-03-11 VITALS — SYSTOLIC BLOOD PRESSURE: 137 MMHG | DIASTOLIC BLOOD PRESSURE: 92 MMHG

## 2021-03-11 VITALS — DIASTOLIC BLOOD PRESSURE: 87 MMHG | SYSTOLIC BLOOD PRESSURE: 128 MMHG

## 2021-03-11 VITALS — DIASTOLIC BLOOD PRESSURE: 95 MMHG | SYSTOLIC BLOOD PRESSURE: 139 MMHG

## 2021-03-11 RX ADMIN — TRAZODONE HYDROCHLORIDE SCH MG: 50 TABLET ORAL at 22:00

## 2021-03-11 RX ADMIN — HUMAN INSULIN PRN UNIT: 100 INJECTION, SOLUTION SUBCUTANEOUS at 22:25

## 2021-03-11 RX ADMIN — ALPRAZOLAM SCH MG: 1 TABLET ORAL at 09:38

## 2021-03-11 RX ADMIN — LISINOPRIL SCH MG: 10 TABLET ORAL at 09:00

## 2021-03-11 RX ADMIN — Medication SCH MG: at 23:01

## 2021-03-11 RX ADMIN — CYCLOBENZAPRINE HYDROCHLORIDE SCH MG: 10 TABLET, FILM COATED ORAL at 09:38

## 2021-03-11 RX ADMIN — Medication SCH EACH: at 17:32

## 2021-03-11 RX ADMIN — TAMSULOSIN HYDROCHLORIDE SCH MG: 0.4 CAPSULE ORAL at 23:02

## 2021-03-11 RX ADMIN — ATORVASTATIN CALCIUM SCH MG: 10 TABLET, FILM COATED ORAL at 22:00

## 2021-03-11 RX ADMIN — LISINOPRIL SCH MG: 10 TABLET ORAL at 09:38

## 2021-03-11 RX ADMIN — TAMSULOSIN HYDROCHLORIDE SCH MG: 0.4 CAPSULE ORAL at 22:00

## 2021-03-11 RX ADMIN — Medication SCH EACH: at 13:52

## 2021-03-11 RX ADMIN — CYCLOBENZAPRINE HYDROCHLORIDE SCH MG: 10 TABLET, FILM COATED ORAL at 09:00

## 2021-03-11 RX ADMIN — ALPRAZOLAM SCH MG: 1 TABLET ORAL at 09:00

## 2021-03-11 RX ADMIN — Medication SCH MG: at 21:00

## 2021-03-11 RX ADMIN — PANTOPRAZOLE SODIUM SCH MG: 40 TABLET, DELAYED RELEASE ORAL at 07:30

## 2021-03-11 RX ADMIN — ALPRAZOLAM SCH MG: 1 TABLET ORAL at 13:00

## 2021-03-11 RX ADMIN — INSULIN HUMAN PRN UNIT: 100 INJECTION, SOLUTION PARENTERAL at 17:31

## 2021-03-11 RX ADMIN — ATORVASTATIN CALCIUM SCH MG: 10 TABLET, FILM COATED ORAL at 23:01

## 2021-03-11 RX ADMIN — Medication SCH EACH: at 22:22

## 2021-03-11 RX ADMIN — ALPRAZOLAM SCH MG: 1 TABLET ORAL at 17:27

## 2021-03-11 RX ADMIN — CYCLOBENZAPRINE HYDROCHLORIDE SCH MG: 10 TABLET, FILM COATED ORAL at 17:27

## 2021-03-11 RX ADMIN — INSULIN HUMAN PRN UNIT: 100 INJECTION, SOLUTION PARENTERAL at 06:32

## 2021-03-11 RX ADMIN — Medication SCH MG: at 13:00

## 2021-03-11 RX ADMIN — Medication SCH MG: at 04:38

## 2021-03-11 RX ADMIN — CYCLOBENZAPRINE HYDROCHLORIDE SCH MG: 10 TABLET, FILM COATED ORAL at 13:00

## 2021-03-11 RX ADMIN — PANTOPRAZOLE SODIUM SCH MG: 40 TABLET, DELAYED RELEASE ORAL at 09:37

## 2021-03-11 RX ADMIN — Medication SCH EACH: at 06:31

## 2021-03-11 NOTE — NUR
MS RN NOTES

TRAZODONE DOSE HELD,JUST WOKE UP FROM OVERSEDATION AND WAS JUST MEDICATED WITH OXYCONTIN  
FOR PAIN

## 2021-03-11 NOTE — NUR
MS CISSE NOTES

DUE OXYCONTIN PO HELD,SOUND ASLEEP BUT AROUSABLE,WAS MEDICATED BY DAY NURSE WITH FLEXERIL 
AND XANAX AT 1700

-------------------------------------------------------------------------------

Addendum: 03/11/21 at 2324 by LIZETTE ANTONIO RN

-------------------------------------------------------------------------------

DUE OXYCONTIN 80MG PO ADMINISTERED AT 2301 WHEN PATIENT ALREADY AWAKE,IN PAIN

## 2021-03-11 NOTE — NUR
RN notes

Pt's complaining of pain on L hip. Administered oxycontin 20 mg/4 tabs/po as ordered. BP 
137/92 and pulse 81. Safety precautions is maintained. Will continue to monitor.

## 2021-03-11 NOTE — NUR
MS RN NOTES

RECEIVED ON BED SLEEPING,BREATHING NON LABORED,AROUSABLE TO VERBAL STIMULI,SALINE LOCK RAC 
INTACT AND PATENT.CALL LIGHT IN REACH,WILL CONTINUE TO MONITOR FOR PAIN ON LEFT HIP 
DISLOCATION.

## 2021-03-11 NOTE — NUR
MS RN NOTES

AWAKE THIS TIME MOANS IN PAIN,WITH FACIAL GRIMACE NOTED.DUE OXYCONTIN 80MG PO GIVE THIS TIME 
ALONG WITH OTHER MEDICATION DUE AT 2200,TAKE WITH APPLE SAUCE.

## 2021-03-11 NOTE — NUR
RN closing notes

Pt is resting in bed comfortably. Pt is alert and orientedX3, lethargic and easily 
arousable. Respiration is normal in room air. No SOB. No S/S of distress noted. VS is 
stable. Afebrile. Routine meds were given as ordered. IV site at RAc# 18 is clean, intact, 
flushes well and SL. Kept Pt clean, dry and comfortable. All needs met and attended. Safety 
precautions is maintained. Bed at low position, brakes locked, side rails upX2, urinal at 
the bed side and call light is within reach. Will endorse to morning nurse for ADRIANNE.

## 2021-03-11 NOTE — NUR
ms rn

received on bed, awake,alert, x2,lethargic and confused at times,came in w/ left hip 
dislocation,abdomen soft,no sob noted,patient is very sleepy,will monitor patient's 
condition.

## 2021-03-12 VITALS — DIASTOLIC BLOOD PRESSURE: 63 MMHG | SYSTOLIC BLOOD PRESSURE: 106 MMHG

## 2021-03-12 VITALS — SYSTOLIC BLOOD PRESSURE: 106 MMHG | DIASTOLIC BLOOD PRESSURE: 63 MMHG

## 2021-03-12 VITALS — DIASTOLIC BLOOD PRESSURE: 75 MMHG | SYSTOLIC BLOOD PRESSURE: 131 MMHG

## 2021-03-12 VITALS — SYSTOLIC BLOOD PRESSURE: 103 MMHG | DIASTOLIC BLOOD PRESSURE: 64 MMHG

## 2021-03-12 RX ADMIN — Medication SCH EACH: at 12:22

## 2021-03-12 RX ADMIN — INSULIN HUMAN PRN UNIT: 100 INJECTION, SOLUTION PARENTERAL at 16:56

## 2021-03-12 RX ADMIN — Medication SCH MG: at 12:31

## 2021-03-12 RX ADMIN — HUMAN INSULIN PRN UNIT: 100 INJECTION, SOLUTION SUBCUTANEOUS at 21:19

## 2021-03-12 RX ADMIN — ALPRAZOLAM SCH MG: 1 TABLET ORAL at 16:28

## 2021-03-12 RX ADMIN — ALPRAZOLAM SCH MG: 1 TABLET ORAL at 08:28

## 2021-03-12 RX ADMIN — LISINOPRIL SCH MG: 10 TABLET ORAL at 08:25

## 2021-03-12 RX ADMIN — CYCLOBENZAPRINE HYDROCHLORIDE SCH MG: 10 TABLET, FILM COATED ORAL at 16:28

## 2021-03-12 RX ADMIN — Medication SCH MG: at 21:04

## 2021-03-12 RX ADMIN — INSULIN HUMAN PRN UNIT: 100 INJECTION, SOLUTION PARENTERAL at 12:33

## 2021-03-12 RX ADMIN — CYCLOBENZAPRINE HYDROCHLORIDE SCH MG: 10 TABLET, FILM COATED ORAL at 08:28

## 2021-03-12 RX ADMIN — ALPRAZOLAM SCH MG: 1 TABLET ORAL at 12:32

## 2021-03-12 RX ADMIN — Medication SCH EACH: at 16:30

## 2021-03-12 RX ADMIN — ATORVASTATIN CALCIUM SCH MG: 10 TABLET, FILM COATED ORAL at 21:04

## 2021-03-12 RX ADMIN — INSULIN HUMAN PRN UNIT: 100 INJECTION, SOLUTION PARENTERAL at 06:04

## 2021-03-12 RX ADMIN — TRAZODONE HYDROCHLORIDE SCH MG: 50 TABLET ORAL at 21:08

## 2021-03-12 RX ADMIN — Medication SCH EACH: at 21:20

## 2021-03-12 RX ADMIN — CYCLOBENZAPRINE HYDROCHLORIDE SCH MG: 10 TABLET, FILM COATED ORAL at 12:30

## 2021-03-12 RX ADMIN — Medication SCH MG: at 06:02

## 2021-03-12 RX ADMIN — Medication SCH EACH: at 06:03

## 2021-03-12 RX ADMIN — PANTOPRAZOLE SODIUM SCH MG: 40 TABLET, DELAYED RELEASE ORAL at 08:24

## 2021-03-12 RX ADMIN — TAMSULOSIN HYDROCHLORIDE SCH MG: 0.4 CAPSULE ORAL at 21:04

## 2021-03-12 NOTE — NUR
MS RN OPENING NOTE



PATIENT IS IN BED RESTING COMFORTABLY. PATIENT IS ON ROOM AIR TOLERATING WELL. TOLERATING 
WELL, NO SOB NOTED. PATIENT IS A LITTLE CONFUSED AT THE MOMENT. SAFETY PRECAUTIONS ARE ON. 
BED IN THE LOWEST POSITION, SIDE RAILS ARE UP, CALL LIGHT WITHIN REACH. WILL CONTINUE TO 
MONITOR CLOSELY THROUGHOUT THE SHIFT.

## 2021-03-12 NOTE — NUR
RN NOTES

RECEIVED PATIENT IN BED, ALERT AND ORIENTED X3, LETHARGIC, AROUSEABLE, SPO2 AT ROOM AIR 82%, 
PUT ON 2LPM VIA NC, SPO2 WENT UP TO 94%. USES URINAL TO VOID, LEFT HIP DISLOCATION, OPIOID 
DEPENDENT, ON OXYCONTIN ROUTINE. KEPT SAFE, WILL CONTINUE TO MONITOR.

## 2021-03-12 NOTE — NUR
MS RN CLOSING NOTE



PATIENT IS IN BED RESTING COMFORTABLY. PATIENT IS ON ROOM AIR TOLERATING WELL. TOLERATING 
WELL, NO SOB NOTED. PATIENT CAN BE CONFUSED AT TIMES. SAFETY PRECAUTIONS ARE ON. BED IN THE 
LOWEST POSITION, SIDE RAILS ARE UP, CALL LIGHT WITHIN REACH. ENDORSE PATIENT TO NIGHT SHIFT 
NURSE FOR ADRIANNE.

## 2021-03-13 VITALS — SYSTOLIC BLOOD PRESSURE: 123 MMHG | DIASTOLIC BLOOD PRESSURE: 88 MMHG

## 2021-03-13 VITALS — SYSTOLIC BLOOD PRESSURE: 98 MMHG | DIASTOLIC BLOOD PRESSURE: 64 MMHG

## 2021-03-13 VITALS — DIASTOLIC BLOOD PRESSURE: 51 MMHG | SYSTOLIC BLOOD PRESSURE: 103 MMHG

## 2021-03-13 LAB
BASOPHILS # BLD AUTO: 0 /CMM (ref 0–0.2)
BASOPHILS NFR BLD AUTO: 0.5 % (ref 0–2)
EOSINOPHIL NFR BLD AUTO: 2.2 % (ref 0–6)
HCT VFR BLD AUTO: 39 % (ref 39–51)
HGB BLD-MCNC: 13.9 G/DL (ref 13.5–17.5)
LYMPHOCYTES NFR BLD AUTO: 1.9 /CMM (ref 0.8–4.8)
LYMPHOCYTES NFR BLD AUTO: 24 % (ref 20–44)
MCHC RBC AUTO-ENTMCNC: 35 G/DL (ref 31–36)
MCV RBC AUTO: 92 FL (ref 80–96)
MONOCYTES NFR BLD AUTO: 0.7 /CMM (ref 0.1–1.3)
MONOCYTES NFR BLD AUTO: 9.4 % (ref 2–12)
NEUTROPHILS # BLD AUTO: 5.1 /CMM (ref 1.8–8.9)
NEUTROPHILS NFR BLD AUTO: 63.9 % (ref 43–81)
PLATELET # BLD AUTO: 104 /CMM (ref 150–450)
RBC # BLD AUTO: 4.28 MIL/UL (ref 4.5–6)
WBC NRBC COR # BLD AUTO: 7.9 K/UL (ref 4.3–11)

## 2021-03-13 RX ADMIN — ATORVASTATIN CALCIUM SCH MG: 10 TABLET, FILM COATED ORAL at 21:06

## 2021-03-13 RX ADMIN — HUMAN INSULIN PRN UNIT: 100 INJECTION, SOLUTION SUBCUTANEOUS at 22:26

## 2021-03-13 RX ADMIN — INSULIN HUMAN PRN UNIT: 100 INJECTION, SOLUTION PARENTERAL at 06:46

## 2021-03-13 RX ADMIN — Medication SCH MG: at 04:16

## 2021-03-13 RX ADMIN — Medication SCH MG: at 21:06

## 2021-03-13 RX ADMIN — ALPRAZOLAM SCH MG: 1 TABLET ORAL at 12:32

## 2021-03-13 RX ADMIN — PANTOPRAZOLE SODIUM SCH MG: 40 TABLET, DELAYED RELEASE ORAL at 08:09

## 2021-03-13 RX ADMIN — CYCLOBENZAPRINE HYDROCHLORIDE SCH MG: 10 TABLET, FILM COATED ORAL at 12:30

## 2021-03-13 RX ADMIN — ALPRAZOLAM SCH MG: 1 TABLET ORAL at 08:10

## 2021-03-13 RX ADMIN — Medication SCH EACH: at 16:39

## 2021-03-13 RX ADMIN — ENOXAPARIN SODIUM SCH MG: 40 INJECTION SUBCUTANEOUS at 02:44

## 2021-03-13 RX ADMIN — Medication SCH EACH: at 22:22

## 2021-03-13 RX ADMIN — HUMAN INSULIN PRN UNIT: 100 INJECTION, SOLUTION SUBCUTANEOUS at 12:24

## 2021-03-13 RX ADMIN — CYCLOBENZAPRINE HYDROCHLORIDE SCH MG: 10 TABLET, FILM COATED ORAL at 16:39

## 2021-03-13 RX ADMIN — ALPRAZOLAM SCH MG: 1 TABLET ORAL at 17:00

## 2021-03-13 RX ADMIN — CYCLOBENZAPRINE HYDROCHLORIDE SCH MG: 10 TABLET, FILM COATED ORAL at 08:09

## 2021-03-13 RX ADMIN — TRAZODONE HYDROCHLORIDE SCH MG: 50 TABLET ORAL at 21:07

## 2021-03-13 RX ADMIN — Medication SCH EACH: at 12:24

## 2021-03-13 RX ADMIN — LISINOPRIL SCH MG: 10 TABLET ORAL at 09:00

## 2021-03-13 RX ADMIN — ALPRAZOLAM SCH MG: 1 TABLET ORAL at 16:39

## 2021-03-13 RX ADMIN — Medication SCH MG: at 12:31

## 2021-03-13 RX ADMIN — TAMSULOSIN HYDROCHLORIDE SCH MG: 0.4 CAPSULE ORAL at 21:07

## 2021-03-13 RX ADMIN — HUMAN INSULIN PRN UNIT: 100 INJECTION, SOLUTION SUBCUTANEOUS at 16:41

## 2021-03-13 RX ADMIN — Medication SCH EACH: at 06:47

## 2021-03-13 NOTE — NUR
RN NOTES

ALERT AND ORIENTED X3, 3LPM VIA NC TO KEEP SPO2 > 93%, ON RA SPO2 88%, GENERALIZED WEAKNESS, 
LETHARGIC, AROUSEABLE BY VOICE AND TOUCH, RIGHT HIP DISLOCATION, OXYCONTIN AS SCHEDULED, NO 
OTHER PRN PAIN MEDICATION GIVEN. SCANT URINE OUTPUT, BLADDER SCAN PERFORMED,  ML, 
NOTIFIED NP BONITA, NO NEW ORDER. NO DISCOMFORT ON PALPATION, BLADDER NOT DISTENDED. SCD 
AND LOVENOX FOR VTE, LAST CBC 3/9/21, PER STAT CBC, PLATELET 104, OK TO GIVE LOVENOX PER NP 
BONITA. HIP REDUCTION UNSUCCESSFUL, REFERRED TO DR. SINHA FOR POSSIBLE SURGERY. MONITOR 
URINE OUTPUT

## 2021-03-13 NOTE — NUR
RN Closing note



Patient in bed, remains AO x 4. Respiratory even and unlabored with oxygen at 3LPM via NC. 
Skin is warm to touch, keep clean/dry. Patient had urinated 350 ml in urinal plus wet leandro, 
no s/s of urinary retention observed. Keep elevated HOB for ensure airway and aspiration 
precaution, also lowest bed position for safety. Patient refused Xanax and PO pain 
medication. Patient Encouraged oral fluid intake as tolerated. Call light within reach, will 
endorse to night shift.

## 2021-03-13 NOTE — NUR
RN NOTES

PERFORMED BLADDER SCAN, PATIENT HAS NO URINE OUTPUT SINCE START OF SHIFT,  ML, 
BLADDER NOT DISTENDED, NO DISCOMFORT ON PALPATION. OFFERED FLUIDS WHILE AWAKE

## 2021-03-13 NOTE — NUR
RN Opening note



Received patient in bed, drowsiness but able to arouse voice or light pain stimuli. AO x 2-3 
does no appears pain or distress. Skin is warm to touch, keep clean/dry, intact IV with SL. 
Respiratory even and unlabored with oxygen at 3LPM via n/c. Kept elevated HIB for ensure 
airway and aspiration precaution and lowering bed position for safety. Call light within 
reach, will continue to monitor.

## 2021-03-13 NOTE — NUR
RN NOTES



Received patient in bed, AO x 2-3 does no appears pain or distress. Skin is warm to touch, 
keep clean/dry, intact IV with SL. Respiratory even and unlabored on room air. Kept elevated 
HIB for ensure airway and aspiration precaution and lowering bed position for safety. Call 
light within reach, will continue to monitor.

## 2021-03-14 VITALS — DIASTOLIC BLOOD PRESSURE: 70 MMHG | SYSTOLIC BLOOD PRESSURE: 98 MMHG

## 2021-03-14 VITALS — DIASTOLIC BLOOD PRESSURE: 81 MMHG | SYSTOLIC BLOOD PRESSURE: 134 MMHG

## 2021-03-14 VITALS — SYSTOLIC BLOOD PRESSURE: 110 MMHG | DIASTOLIC BLOOD PRESSURE: 75 MMHG

## 2021-03-14 RX ADMIN — CYCLOBENZAPRINE HYDROCHLORIDE SCH MG: 10 TABLET, FILM COATED ORAL at 12:50

## 2021-03-14 RX ADMIN — Medication SCH EACH: at 12:00

## 2021-03-14 RX ADMIN — Medication SCH EACH: at 17:58

## 2021-03-14 RX ADMIN — PANTOPRAZOLE SODIUM SCH MG: 40 TABLET, DELAYED RELEASE ORAL at 07:30

## 2021-03-14 RX ADMIN — TRAZODONE HYDROCHLORIDE SCH MG: 50 TABLET ORAL at 22:00

## 2021-03-14 RX ADMIN — Medication SCH MG: at 12:50

## 2021-03-14 RX ADMIN — ALPRAZOLAM SCH MG: 1 TABLET ORAL at 07:53

## 2021-03-14 RX ADMIN — LISINOPRIL SCH MG: 10 TABLET ORAL at 09:00

## 2021-03-14 RX ADMIN — HUMAN INSULIN PRN UNIT: 100 INJECTION, SOLUTION SUBCUTANEOUS at 17:59

## 2021-03-14 RX ADMIN — TAMSULOSIN HYDROCHLORIDE SCH MG: 0.4 CAPSULE ORAL at 22:00

## 2021-03-14 RX ADMIN — ATORVASTATIN CALCIUM SCH MG: 10 TABLET, FILM COATED ORAL at 22:00

## 2021-03-14 RX ADMIN — ALPRAZOLAM SCH MG: 1 TABLET ORAL at 15:04

## 2021-03-14 RX ADMIN — ALPRAZOLAM SCH MG: 1 TABLET ORAL at 17:00

## 2021-03-14 RX ADMIN — CYCLOBENZAPRINE HYDROCHLORIDE SCH MG: 10 TABLET, FILM COATED ORAL at 09:00

## 2021-03-14 RX ADMIN — Medication SCH MG: at 05:00

## 2021-03-14 RX ADMIN — Medication SCH EACH: at 22:00

## 2021-03-14 RX ADMIN — Medication SCH EACH: at 07:03

## 2021-03-14 RX ADMIN — Medication SCH MG: at 21:00

## 2021-03-14 RX ADMIN — CYCLOBENZAPRINE HYDROCHLORIDE SCH MG: 10 TABLET, FILM COATED ORAL at 17:00

## 2021-03-14 RX ADMIN — ENOXAPARIN SODIUM SCH MG: 40 INJECTION SUBCUTANEOUS at 09:00

## 2021-03-14 NOTE — NUR
RN Closing note



Patient in bed, remains 2-3 confused, patient too sedated and held Xanax and 
cyclobenzaprine.  Respiratory even and unlabored on room air. Skin is warm to touch, keep 
clean/dry. Patient removed IV, during day shift, IV site on left forearm and intact. Keep 
elevated HOB for ensure airway and aspiration precaution, also lowest bed position for 
safety. Patient refused Xanax and PO pain medication. Patient Encouraged oral fluid intake 
as tolerated. Obtained sign in consent for tomorrow procedure for left hip, patient NPO 
after midnight. Call light within reach, will endorse to night shift.

## 2021-03-14 NOTE — NUR
lvn notes

 pt too sedated , Skin warm to touch , no signs of any distress noted. hold due meds at this 
time per Dr Addison as ordered. blood sugar checked done 306, it is ok to administered 
insulin even pt NPO after 12 MN.

## 2021-03-14 NOTE — NUR
RN Opening note



Received patient in bed, AO x 2-3, confuse, able to responds all stimuli. Skin is warm to 
touch, keep clean/dry, intact IV with SL. Respiratory even and unlabored on room air. Kept 
elevated HOB for ensure airway and aspiration precaution and lowering bed position for 
safety. Call light within reach, will continue to monitor.

## 2021-03-14 NOTE — NUR
MS LVN INITIAL NOTES

 Received pt in bed resting with eyes closed but arousable to  touch. no signs of any 
distress noted. kept him warm and comfortable at all times. Bed in low and lock in position 
with side rails and bed alarm set for safety. will continue monitoring.

## 2021-03-14 NOTE — NUR
Received respose from Dr. Bullock: Sx tomorrow, keep NPO after midnight, consent to closed 
vs. open reduction left hip with possible revision. Noted and carry out.

## 2021-03-14 NOTE — NUR
lvn notes

 patient so sleepy but arouse when you waking him up but he just open his eyes then back to 
sleep again also noticed he's not in any discomfort at this times. breathing even and 
unlabored not in any distress. Blood pressure also 98/70 heart rate 92. 

-------------------------------------------------------------------------------

Addendum: 03/14/21 at 2136 by DARRICK SIN LVN

-------------------------------------------------------------------------------

melvin Aragon .

## 2021-03-14 NOTE — NUR
RN NOTES



Patient in bed, remains AO x 4. Respiratory even and unlabored on room. Skin is warm to 
touch, keep clean/dry. Keep elevated HOB for ensure airway and aspiration precaution, also 
lowest bed position for safety. Patient refused Xanax and PO pain medication. Patient 
Encouraged oral fluid intake as tolerated. Call light within reach, will endorse to day 
shift.

## 2021-03-15 VITALS — DIASTOLIC BLOOD PRESSURE: 81 MMHG | SYSTOLIC BLOOD PRESSURE: 135 MMHG

## 2021-03-15 VITALS — SYSTOLIC BLOOD PRESSURE: 151 MMHG | DIASTOLIC BLOOD PRESSURE: 87 MMHG

## 2021-03-15 VITALS — SYSTOLIC BLOOD PRESSURE: 110 MMHG | DIASTOLIC BLOOD PRESSURE: 78 MMHG

## 2021-03-15 LAB
ALBUMIN SERPL BCP-MCNC: 2.7 G/DL (ref 3.4–5)
ALP SERPL-CCNC: 254 U/L (ref 46–116)
ALT SERPL W P-5'-P-CCNC: 54 U/L (ref 12–78)
AST SERPL W P-5'-P-CCNC: 41 U/L (ref 15–37)
BASOPHILS # BLD AUTO: 0 /CMM (ref 0–0.2)
BASOPHILS NFR BLD AUTO: 0.4 % (ref 0–2)
BILIRUB SERPL-MCNC: 0.7 MG/DL (ref 0.2–1)
BUN SERPL-MCNC: 12 MG/DL (ref 7–18)
CALCIUM SERPL-MCNC: 9.7 MG/DL (ref 8.5–10.1)
CHLORIDE SERPL-SCNC: 102 MMOL/L (ref 98–107)
CO2 SERPL-SCNC: 33 MMOL/L (ref 21–32)
CREAT SERPL-MCNC: 0.8 MG/DL (ref 0.6–1.3)
EOSINOPHIL NFR BLD AUTO: 1.6 % (ref 0–6)
GLUCOSE SERPL-MCNC: 216 MG/DL (ref 74–106)
HCT VFR BLD AUTO: 45 % (ref 39–51)
HGB BLD-MCNC: 15.9 G/DL (ref 13.5–17.5)
LYMPHOCYTES NFR BLD AUTO: 0.9 /CMM (ref 0.8–4.8)
LYMPHOCYTES NFR BLD AUTO: 11.4 % (ref 20–44)
MAGNESIUM SERPL-MCNC: 1.9 MG/DL (ref 1.8–2.4)
MCHC RBC AUTO-ENTMCNC: 35 G/DL (ref 31–36)
MCV RBC AUTO: 93 FL (ref 80–96)
MONOCYTES NFR BLD AUTO: 0.7 /CMM (ref 0.1–1.3)
MONOCYTES NFR BLD AUTO: 9 % (ref 2–12)
NEUTROPHILS # BLD AUTO: 6 /CMM (ref 1.8–8.9)
NEUTROPHILS NFR BLD AUTO: 77.6 % (ref 43–81)
PHOSPHATE SERPL-MCNC: 3.5 MG/DL (ref 2.5–4.9)
PLATELET # BLD AUTO: 138 /CMM (ref 150–450)
POTASSIUM SERPL-SCNC: 4.2 MMOL/L (ref 3.5–5.1)
PROT SERPL-MCNC: 9.6 G/DL (ref 6.4–8.2)
RBC # BLD AUTO: 4.87 MIL/UL (ref 4.5–6)
SODIUM SERPL-SCNC: 139 MMOL/L (ref 136–145)
WBC NRBC COR # BLD AUTO: 7.8 K/UL (ref 4.3–11)

## 2021-03-15 PROCEDURE — 0SWS0JZ REVISION OF SYNTHETIC SUBSTITUTE IN LEFT HIP JOINT, FEMORAL SURFACE, OPEN APPROACH: ICD-10-PCS

## 2021-03-15 PROCEDURE — 0KQP0ZZ REPAIR LEFT HIP MUSCLE, OPEN APPROACH: ICD-10-PCS

## 2021-03-15 RX ADMIN — CYCLOBENZAPRINE HYDROCHLORIDE SCH MG: 10 TABLET, FILM COATED ORAL at 16:57

## 2021-03-15 RX ADMIN — Medication SCH MG: at 05:00

## 2021-03-15 RX ADMIN — ALPRAZOLAM SCH MG: 1 TABLET ORAL at 08:02

## 2021-03-15 RX ADMIN — Medication SCH EACH: at 11:27

## 2021-03-15 RX ADMIN — Medication SCH MG: at 22:07

## 2021-03-15 RX ADMIN — Medication SCH MG: at 12:29

## 2021-03-15 RX ADMIN — HUMAN INSULIN PRN UNIT: 100 INJECTION, SOLUTION SUBCUTANEOUS at 22:25

## 2021-03-15 RX ADMIN — ALPRAZOLAM SCH MG: 1 TABLET ORAL at 12:45

## 2021-03-15 RX ADMIN — TRAZODONE HYDROCHLORIDE SCH MG: 50 TABLET ORAL at 22:07

## 2021-03-15 RX ADMIN — ALPRAZOLAM SCH MG: 1 TABLET ORAL at 16:57

## 2021-03-15 RX ADMIN — ATORVASTATIN CALCIUM SCH MG: 10 TABLET, FILM COATED ORAL at 22:07

## 2021-03-15 RX ADMIN — Medication SCH EACH: at 06:17

## 2021-03-15 RX ADMIN — INSULIN HUMAN PRN UNIT: 100 INJECTION, SOLUTION PARENTERAL at 06:18

## 2021-03-15 RX ADMIN — HUMAN INSULIN PRN UNIT: 100 INJECTION, SOLUTION SUBCUTANEOUS at 00:47

## 2021-03-15 RX ADMIN — CYCLOBENZAPRINE HYDROCHLORIDE SCH MG: 10 TABLET, FILM COATED ORAL at 12:45

## 2021-03-15 RX ADMIN — POTASSIUM CHLORIDE, DEXTROSE MONOHYDRATE AND SODIUM CHLORIDE SCH MLS/HR: 150; 5; 450 INJECTION, SOLUTION INTRAVENOUS at 14:17

## 2021-03-15 RX ADMIN — TAMSULOSIN HYDROCHLORIDE SCH MG: 0.4 CAPSULE ORAL at 22:00

## 2021-03-15 RX ADMIN — HYDROCODONE BITARTRATE AND ACETAMINOPHEN PRN TAB: 10; 325 TABLET ORAL at 16:58

## 2021-03-15 RX ADMIN — ENOXAPARIN SODIUM SCH MG: 40 INJECTION SUBCUTANEOUS at 08:02

## 2021-03-15 RX ADMIN — DEXTROSE MONOHYDRATE SCH MLS/HR: 50 INJECTION, SOLUTION INTRAVENOUS at 17:24

## 2021-03-15 RX ADMIN — PANTOPRAZOLE SODIUM SCH MG: 40 TABLET, DELAYED RELEASE ORAL at 06:17

## 2021-03-15 RX ADMIN — LISINOPRIL SCH MG: 10 TABLET ORAL at 08:01

## 2021-03-15 RX ADMIN — Medication SCH EACH: at 22:15

## 2021-03-15 RX ADMIN — Medication SCH EACH: at 17:13

## 2021-03-15 RX ADMIN — INSULIN HUMAN PRN UNIT: 100 INJECTION, SOLUTION PARENTERAL at 17:17

## 2021-03-15 RX ADMIN — CYCLOBENZAPRINE HYDROCHLORIDE SCH MG: 10 TABLET, FILM COATED ORAL at 08:01

## 2021-03-15 NOTE — NUR
MS RN OPENING NOTE



PT RECEIVED AWAKE, RESTING IN BED IN NO APPARENT DISTRESS AT THIS TIME. PT IS A/O X 2-3, 
VERBAL, ABLE TO MAKE NEEDS KNOWN WITH NO C/O PAIN AT THIS TIME. PT IS ON ROOM AIR WITH NO 
S/SX OF RESPIRATORY DISTRESS OR SOB. PT HAS AN IV ON THE LEFT FOREARM G#20, PATENT, INTACT 
AND FLUSHING WELL WITH NO S/SX OF INFILTRATION, IRRITATION OR INFECTION. PT SCHEDULED FOR 
OPEN REDUCTION OF LEFT HIP SURGERY TODAY AT 9:30AM AND IS AWARE. SAFETY MEASURES IN PLACE: 
BED IN LOWEST POSITION AND LOCKED WITH BOTH UPPER SIDE RAILS UP X2. CALL LIGHT PLACED WITHIN 
REACH. WILL CONTINUE TO MONITOR.

## 2021-03-15 NOTE — NUR
MS RN CLOSING NOTE



PT IS AWAKE, RESTING IN BED IN NO APPARENT DISTRESS AT THIS TIME. PT IS A/O X 3, VERBAL, 
ABLE TO MAKE NEEDS KNOWN WITH NO C/O PAIN AT THIS TIME. PT IS ON ROOM AIR WITH NO S/SX OF 
RESPIRATORY DISTRESS OR SOB. PT HAS AN IV ON THE LEFT FOREARM G#20, PATENT, INTACT AND 
FLUSHING WELL, RUNNING D5 1/2 NS ADDED WITH 20 MEQ OF POTASSIUM AT 75 ML/HR, WITH NO S/SX OF 
INFILTRATION, IRRITATION OR INFECTION. SAFETY MEASURES MAINTAINED: BED IN LOWEST, LOCKED 
POSITION WITH BOTH UPPER SIDE RAILS UP X2. CALL LIGHT PLACED WITHIN REACH. WILL ENDORSE TO 
NIGHT SHIFT NURSE.

## 2021-03-15 NOTE — NUR
MS RN NOTE



PT C/O ACHING LEFT HIP PAIN RATED 7/10. ADMINISTERED NORCO  1 TAB PO Q4H PRN PER PT 
REQUEST. WILL CONTINUE TO MONITOR.

## 2021-03-15 NOTE — NUR
ms lvn closing notes

 pt awake and alert after checking his blood sugar 209, no insulin due at this time because 
pt NPO going for surgery today. No signs of any hyper glycemia noted. morning care done. Pt 
stable throughout the night no signs of any discomfort noted unless he moved his legs. kept 
him warm and comfortable at all times. be din low and lock in position with side rails x2 up 
and place call light at reach. will endorse to am nurse for continuity of care.

## 2021-03-16 VITALS — SYSTOLIC BLOOD PRESSURE: 105 MMHG | DIASTOLIC BLOOD PRESSURE: 67 MMHG

## 2021-03-16 VITALS — SYSTOLIC BLOOD PRESSURE: 114 MMHG | DIASTOLIC BLOOD PRESSURE: 64 MMHG

## 2021-03-16 VITALS — DIASTOLIC BLOOD PRESSURE: 72 MMHG | SYSTOLIC BLOOD PRESSURE: 101 MMHG

## 2021-03-16 LAB
ALBUMIN SERPL ELPH-MCNC: 2.7 G/DL (ref 2.9–4.4)
ALBUMIN/GLOB SERPL: 0.4 {RATIO} (ref 0.7–1.7)
ALPHA1 GLOB SERPL ELPH-MCNC: 0.4 G/DL (ref 0–0.4)
ALPHA2 GLOB SERPL ELPH-MCNC: 1 G/DL (ref 0.4–1)
B-GLOBULIN SERPL ELPH-MCNC: 1.7 G/DL (ref 0.7–1.3)
GAMMA GLOB SERPL ELPH-MCNC: 3 G/DL (ref 0.4–1.8)
GLOBULIN SER CALC-MCNC: 6.1 G/DL (ref 2.2–3.9)

## 2021-03-16 RX ADMIN — INSULIN HUMAN PRN UNIT: 100 INJECTION, SOLUTION PARENTERAL at 17:20

## 2021-03-16 RX ADMIN — Medication SCH EACH: at 16:53

## 2021-03-16 RX ADMIN — HUMAN INSULIN PRN UNIT: 100 INJECTION, SOLUTION SUBCUTANEOUS at 22:08

## 2021-03-16 RX ADMIN — POTASSIUM CHLORIDE, DEXTROSE MONOHYDRATE AND SODIUM CHLORIDE SCH MLS/HR: 150; 5; 450 INJECTION, SOLUTION INTRAVENOUS at 16:08

## 2021-03-16 RX ADMIN — Medication SCH MG: at 12:58

## 2021-03-16 RX ADMIN — ATORVASTATIN CALCIUM SCH MG: 10 TABLET, FILM COATED ORAL at 21:42

## 2021-03-16 RX ADMIN — CYCLOBENZAPRINE HYDROCHLORIDE SCH MG: 10 TABLET, FILM COATED ORAL at 16:43

## 2021-03-16 RX ADMIN — POTASSIUM CHLORIDE, DEXTROSE MONOHYDRATE AND SODIUM CHLORIDE SCH MLS/HR: 150; 5; 450 INJECTION, SOLUTION INTRAVENOUS at 02:16

## 2021-03-16 RX ADMIN — INSULIN HUMAN PRN UNIT: 100 INJECTION, SOLUTION PARENTERAL at 06:18

## 2021-03-16 RX ADMIN — ALPRAZOLAM SCH MG: 1 TABLET ORAL at 13:00

## 2021-03-16 RX ADMIN — TAMSULOSIN HYDROCHLORIDE SCH MG: 0.4 CAPSULE ORAL at 21:40

## 2021-03-16 RX ADMIN — Medication SCH EACH: at 11:15

## 2021-03-16 RX ADMIN — ALPRAZOLAM SCH MG: 1 TABLET ORAL at 09:00

## 2021-03-16 RX ADMIN — PANTOPRAZOLE SODIUM SCH MG: 40 TABLET, DELAYED RELEASE ORAL at 06:18

## 2021-03-16 RX ADMIN — Medication SCH MG: at 04:39

## 2021-03-16 RX ADMIN — TRAZODONE HYDROCHLORIDE SCH MG: 50 TABLET ORAL at 21:40

## 2021-03-16 RX ADMIN — CYCLOBENZAPRINE HYDROCHLORIDE SCH MG: 10 TABLET, FILM COATED ORAL at 08:58

## 2021-03-16 RX ADMIN — LISINOPRIL SCH MG: 10 TABLET ORAL at 09:00

## 2021-03-16 RX ADMIN — CYCLOBENZAPRINE HYDROCHLORIDE SCH MG: 10 TABLET, FILM COATED ORAL at 12:58

## 2021-03-16 RX ADMIN — DEXTROSE MONOHYDRATE SCH MLS/HR: 50 INJECTION, SOLUTION INTRAVENOUS at 02:11

## 2021-03-16 RX ADMIN — ALPRAZOLAM SCH MG: 1 TABLET ORAL at 16:37

## 2021-03-16 RX ADMIN — ENOXAPARIN SODIUM SCH MG: 40 INJECTION SUBCUTANEOUS at 09:01

## 2021-03-16 RX ADMIN — Medication SCH EACH: at 21:55

## 2021-03-16 RX ADMIN — Medication SCH EACH: at 06:05

## 2021-03-16 RX ADMIN — Medication SCH MG: at 21:42

## 2021-03-16 NOTE — NUR
MS RN CLOSING NOTE



PT IS ASLEEP, RESTING IN BED IN NO APPARENT DISTRESS AT THIS TIME. PT IS A/O X 3, VERBAL, 
ABLE TO MAKE NEEDS KNOWN WITH NO C/O PAIN AT THIS TIME. PT IS ON ROOM AIR WITH NO S/SX OF 
RESPIRATORY DISTRESS OR SOB. PT HAS AN IV ON THE LEFT FOREARM G#20, PATENT, INTACT AND 
FLUSHING WELL, RUNNING D5 1/2 NS ADDED WITH 20 MEQ OF POTASSIUM AT 75 ML/HR, WITH NO S/SX OF 
INFILTRATION, IRRITATION OR INFECTION. SAFETY PRECAUTIONS IN PLACE: BED IN LOW POSITION AND 
LOCKED, RAILS UP X2, CALL LIGHT WITHIN REACH. WILL ENDORSE TO NIGHT SHIFT NURSE.

## 2021-03-16 NOTE — NUR
MS RN CLOSING NOTE



AWAKE, RESTING IN BED IN NO DISTRESS . PT IS A/O X 3, VERBAL, ABLE TO MAKE NEEDS KNOWN 
DENIES C/O PAIN AT THIS TIME. PT ON ROOM AIR WITH NO S/SX OF RESPIRATORY DISTRESS OR SOB. PT 
HAS AN IV ON THE LEFT FOREARM G#20, PATENT, INTACT, RUNNING D5 1/2 NS ADDED WITH 20 MEQ OF 
POTASSIUM AT 75 ML/HR, WITH NO S/SX OF COMPLICATIONS. SAFETY MEASURES MAINTAINED: BED IN 
LOWEST, LOCKED POSITION SIDE RAILS UP X2. CALL LIGHT  WITHIN REACH. PT EATING A SNACK. WILL 
ENDORSE TO ONCOMING SHIFT.

## 2021-03-16 NOTE — NUR
CONCERN FOR  ASPIRATION

PT EATING A SNACK AND BEGAN COUGHING PROFUSELY. PT STATES, " I THINK IT WENT DOWN THE WRONG 
PIPE." PT ENCOURAGED TO SLOW DOWN WHILE EATING SPEECH THERAPY EVAL ORDERED TO ASSESS PTS 
ABILITY TOMORROW TO EAT DRINK SAFELY. WILL CONT TO MONITOR.

## 2021-03-16 NOTE — NUR
RN MS NOTES

PT IN BED, AWAKE, ALERT AND ORIENTED, NO COMPLAINT OF PAIN AT THIS TIME, BREATHING PATTERN 
NORMAL, CALL LIGHT WITHIN REACH, IV FLUIDS INFUSING WELL, KEPT WARM AND COMFORTABLE IN BED.

## 2021-03-17 ENCOUNTER — HOSPITAL ENCOUNTER (INPATIENT)
Dept: HOSPITAL 12 - TELE3 | Age: 71
LOS: 1 days | Discharge: NURSING, REHAB OR LONG-TERM CARE | DRG: 189 | End: 2021-03-18
Payer: MEDICARE

## 2021-03-17 ENCOUNTER — HOSPITAL ENCOUNTER (INPATIENT)
Dept: HOSPITAL 12 - REHABOV3 | Age: 71
Discharge: ANOTHER HOSPITAL | DRG: 949 | End: 2021-03-17
Attending: PHYSICAL MEDICINE & REHABILITATION
Payer: MEDICARE

## 2021-03-17 VITALS — SYSTOLIC BLOOD PRESSURE: 101 MMHG | DIASTOLIC BLOOD PRESSURE: 59 MMHG

## 2021-03-17 VITALS — SYSTOLIC BLOOD PRESSURE: 92 MMHG | DIASTOLIC BLOOD PRESSURE: 56 MMHG

## 2021-03-17 VITALS — SYSTOLIC BLOOD PRESSURE: 117 MMHG | DIASTOLIC BLOOD PRESSURE: 79 MMHG

## 2021-03-17 VITALS — HEIGHT: 70 IN | WEIGHT: 189 LBS | BODY MASS INDEX: 27.06 KG/M2

## 2021-03-17 VITALS — DIASTOLIC BLOOD PRESSURE: 77 MMHG | SYSTOLIC BLOOD PRESSURE: 125 MMHG

## 2021-03-17 DIAGNOSIS — J96.01: Primary | ICD-10-CM

## 2021-03-17 DIAGNOSIS — J44.9: ICD-10-CM

## 2021-03-17 DIAGNOSIS — T40.605A: ICD-10-CM

## 2021-03-17 DIAGNOSIS — E78.5: ICD-10-CM

## 2021-03-17 DIAGNOSIS — J96.01: ICD-10-CM

## 2021-03-17 DIAGNOSIS — N40.0: ICD-10-CM

## 2021-03-17 DIAGNOSIS — G89.4: ICD-10-CM

## 2021-03-17 DIAGNOSIS — E11.9: ICD-10-CM

## 2021-03-17 DIAGNOSIS — T84.021D: ICD-10-CM

## 2021-03-17 DIAGNOSIS — Z96.643: ICD-10-CM

## 2021-03-17 DIAGNOSIS — T84.021D: Primary | ICD-10-CM

## 2021-03-17 DIAGNOSIS — I10: ICD-10-CM

## 2021-03-17 DIAGNOSIS — Z98.1: ICD-10-CM
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
## 2021-03-17 DIAGNOSIS — R53.1: ICD-10-CM

## 2021-03-17 DIAGNOSIS — F17.210: ICD-10-CM

## 2021-03-17 DIAGNOSIS — M85.80: ICD-10-CM

## 2021-03-17 DIAGNOSIS — I95.2: ICD-10-CM

## 2021-03-17 PROCEDURE — G0378 HOSPITAL OBSERVATION PER HR: HCPCS

## 2021-03-17 RX ADMIN — HYDROCODONE BITARTRATE AND ACETAMINOPHEN PRN TAB: 10; 325 TABLET ORAL at 03:16

## 2021-03-17 RX ADMIN — INSULIN HUMAN PRN UNIT: 100 INJECTION, SOLUTION PARENTERAL at 12:07

## 2021-03-17 RX ADMIN — CYCLOBENZAPRINE HYDROCHLORIDE SCH MG: 10 TABLET, FILM COATED ORAL at 08:26

## 2021-03-17 RX ADMIN — LISINOPRIL SCH MG: 10 TABLET ORAL at 08:26

## 2021-03-17 RX ADMIN — Medication SCH MG: at 05:13

## 2021-03-17 RX ADMIN — POTASSIUM CHLORIDE, DEXTROSE MONOHYDRATE AND SODIUM CHLORIDE SCH MLS/HR: 150; 5; 450 INJECTION, SOLUTION INTRAVENOUS at 05:13

## 2021-03-17 RX ADMIN — INSULIN HUMAN PRN UNIT: 100 INJECTION, SOLUTION PARENTERAL at 06:56

## 2021-03-17 RX ADMIN — ALPRAZOLAM SCH MG: 1 TABLET ORAL at 12:05

## 2021-03-17 RX ADMIN — SODIUM CHLORIDE PRN MLS/HR: 0.9 INJECTION, SOLUTION INTRAVENOUS at 21:02

## 2021-03-17 RX ADMIN — ENOXAPARIN SODIUM SCH MG: 40 INJECTION SUBCUTANEOUS at 08:39

## 2021-03-17 RX ADMIN — PANTOPRAZOLE SODIUM SCH MG: 40 TABLET, DELAYED RELEASE ORAL at 08:26

## 2021-03-17 RX ADMIN — Medication SCH EACH: at 12:05

## 2021-03-17 RX ADMIN — Medication SCH EACH: at 06:54

## 2021-03-17 RX ADMIN — CYCLOBENZAPRINE HYDROCHLORIDE SCH MG: 10 TABLET, FILM COATED ORAL at 12:05

## 2021-03-17 RX ADMIN — ALPRAZOLAM SCH MG: 1 TABLET ORAL at 08:26

## 2021-03-17 RX ADMIN — Medication SCH MG: at 12:05

## 2021-03-17 NOTE — NUR
MS RN NOTES



PATIENT IS DISCHARGED. PT IS MEDICALLY STABLE WITH VS /79 AL 70 RR 16 T 98.1 REPORT 
WAS GIVEN TO SUZAN CISSE FROM [facility]. PICKED UP BY 3 EMT'S AND BROUGHT DOWN VIA [facility]. 
HEALTH TEACHINGS AND DISCHARGE INSTRUCTIONS WERE GIVEN. VERBALIZED UNDERSTANDING. REMOVED IV 
ACCESS AND WRISTBAND. PT'S BELONGINGS WAS GIVEN TO THE EMT'S.

## 2021-03-17 NOTE — NUR
pt hard to arouse again, BP 82/49, 95% sat on 2l/nc, informed NP Roberta Newell- Narcan 4mg 
ivp given as ordered PRN, more awake after 5 minutes, /55 HR-80 sat at 96% , follows 
commands and knows his full name, and birthday but not birthdate, will continue to monitor

## 2021-03-17 NOTE — NUR
MS RN OPENING NOTE



RECEIVED PATIENT IN BED. A/O X3. ON ROOM AIR, TOLERATING WELL. NO SOB NOTED. NO S/S OF 
RESPIRATORY DISTRESS. DENIES ANY PAIN AND DISCOMFORT AT THIS TIME. IV ACCESS ON L FA #20 G, 
INTACT AND PATENT, D5 1/2 NS + 20 meq KCL RUNNING AT 75 ML/HR. SAFETY MEASURES MAINTAINED. 
BED IN LOWEST POSITION, BRAKES LOCKED. SIDE RAILS UP X2. CALL LIGHT WITHIN REACH. WILL 
CONTINUE PLAN OF CARE.

## 2021-03-17 NOTE — NUR
received patient in bed, hob elevated. hard to arouse. on oxygen with simple mask at 5lpm. 
breathing is even and non labored. no sob noted. patient came from [facility] and 
admitted to [place] for rehab. however, patient noted with low blood pressure upon admission 
and hard to arouse, in deep sleep. np kayleen whitney came and saw pt, ordered to transfer 
to telemetry, noted and carried out.

## 2021-03-17 NOTE — NUR
Received pt from [facility] via [facility]. Upon arriving on the floor, pt was unarousable to both 
verbal and painful stimuli. Blood pressure 71/44, O2 saturation 93% on 2 L NC, SR on 
monitor, rate of 81, temp 98.3. Rapid response called, NP Roberta Newell came, pt started 
on NS bolus and Narcan 0.4 mg IV push given.

## 2021-03-17 NOTE — NUR
ARU admission process unable to be completed due to patient's condition. Pt to be transfered 
to telemetry unit pre NP Roberta Newell.

## 2021-03-17 NOTE — NUR
PT FOUND TO HAVE NON BLANCHEABLE REDNESS TO SACRUM. PICTURES TAKEN. WOUND CONSULT ORDERED. 
PT ON LISETTE AIR MATTRESS. MEPILEX APPLIED TO AREA.

## 2021-03-17 NOTE — NUR
Received pt lying in bed, sleeping and very drowsy but arouse to verbal and tactile stimuli. 
Falls back to sleep right after arousal. In no acute distress. No signs or symptoms of pain 
or SOB. On O2 at 3LPM via NC in place. O2 sat at 96%. NSR on tele at 90/min.IV site on right 
hand intact and patent. Abductor pillow between legs. Safety measure initiated and call bell 
within reach. Continue to monitor.

## 2021-03-17 NOTE — NUR
Pt awakened but subsequently back to deep sleep. BP went up to 96/51, heart rate 86.  NP 
Roberta Newell on unit and aware. Pt to be transferred to telemetry unit for higher level 
of care.

## 2021-03-17 NOTE — NUR
patient easy to arouse, alert and responsive. no sob noted. body check done and wound 
consult ordered. however, patient refused left hip dressing to be touched, saying it hurts 
too much. patient kept comfortable.call light within reach. will continue to monitor and 
endorse accordingly.

## 2021-03-17 NOTE — NUR
arousable, moves upper extremities when told to, states not hungry, /48 CA 92 sat 97% 
at 2l/nc,, will continue to monitor

## 2021-03-17 NOTE — NUR
WOUND CARE CONSULT: PT PRESENTS WITH SURGICAL DRESSING TO LEFT HIP AND MOISTURE ASSOCIATED 
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 RECOMMENDATIONS MADE FOR SKIN PROTECTION AND DISCUSSED WITH 
NURSING STAFF. PT IS ON LISETTE ISOFLEX LOW AIRLOSS BED. PT DEMONSTRATES ABILITY TO ASSIST 
WITH TURNING AND REPOSITIONING IN BED. MD IN AGREEMENT WITH PLAN OF CARE.

## 2021-03-18 ENCOUNTER — HOSPITAL ENCOUNTER (INPATIENT)
Dept: HOSPITAL 12 - REHABOV3 | Age: 71
LOS: 12 days | Discharge: HOME | DRG: 950 | End: 2021-03-30
Attending: PHYSICAL MEDICINE & REHABILITATION | Admitting: PHYSICAL MEDICINE & REHABILITATION
Payer: MEDICARE

## 2021-03-18 VITALS — DIASTOLIC BLOOD PRESSURE: 76 MMHG | SYSTOLIC BLOOD PRESSURE: 124 MMHG

## 2021-03-18 VITALS — SYSTOLIC BLOOD PRESSURE: 107 MMHG | DIASTOLIC BLOOD PRESSURE: 66 MMHG

## 2021-03-18 VITALS — DIASTOLIC BLOOD PRESSURE: 85 MMHG | SYSTOLIC BLOOD PRESSURE: 142 MMHG

## 2021-03-18 VITALS — HEIGHT: 70 IN | BODY MASS INDEX: 25.77 KG/M2 | WEIGHT: 180 LBS

## 2021-03-18 VITALS — DIASTOLIC BLOOD PRESSURE: 67 MMHG | SYSTOLIC BLOOD PRESSURE: 106 MMHG

## 2021-03-18 VITALS — SYSTOLIC BLOOD PRESSURE: 123 MMHG | DIASTOLIC BLOOD PRESSURE: 69 MMHG

## 2021-03-18 DIAGNOSIS — M85.80: ICD-10-CM

## 2021-03-18 DIAGNOSIS — E78.5: ICD-10-CM

## 2021-03-18 DIAGNOSIS — M54.2: ICD-10-CM

## 2021-03-18 DIAGNOSIS — Z98.1: ICD-10-CM

## 2021-03-18 DIAGNOSIS — M54.5: ICD-10-CM

## 2021-03-18 DIAGNOSIS — Z96.643: ICD-10-CM

## 2021-03-18 DIAGNOSIS — Z88.0: ICD-10-CM

## 2021-03-18 DIAGNOSIS — R53.1: ICD-10-CM

## 2021-03-18 DIAGNOSIS — T84.021D: Primary | ICD-10-CM

## 2021-03-18 DIAGNOSIS — I95.2: ICD-10-CM

## 2021-03-18 DIAGNOSIS — F43.10: ICD-10-CM

## 2021-03-18 DIAGNOSIS — F17.210: ICD-10-CM

## 2021-03-18 DIAGNOSIS — G89.4: ICD-10-CM

## 2021-03-18 DIAGNOSIS — N40.0: ICD-10-CM

## 2021-03-18 DIAGNOSIS — E11.9: ICD-10-CM

## 2021-03-18 DIAGNOSIS — T40.605A: ICD-10-CM

## 2021-03-18 DIAGNOSIS — J44.9: ICD-10-CM

## 2021-03-18 DIAGNOSIS — I10: ICD-10-CM

## 2021-03-18 PROCEDURE — A9150 MISC/EXPER NON-PRESCRIPT DRU: HCPCS

## 2021-03-18 RX ADMIN — SODIUM CHLORIDE PRN UNIT: 9 INJECTION, SOLUTION INTRAVENOUS at 00:19

## 2021-03-18 RX ADMIN — Medication SCH EACH: at 00:17

## 2021-03-18 RX ADMIN — Medication SCH EACH: at 16:12

## 2021-03-18 RX ADMIN — TAMSULOSIN HYDROCHLORIDE SCH MG: 0.4 CAPSULE ORAL at 20:43

## 2021-03-18 RX ADMIN — SODIUM CHLORIDE PRN UNIT: 9 INJECTION, SOLUTION INTRAVENOUS at 20:33

## 2021-03-18 RX ADMIN — Medication SCH EACH: at 05:32

## 2021-03-18 RX ADMIN — Medication SCH EACH: at 12:41

## 2021-03-18 RX ADMIN — ATORVASTATIN CALCIUM SCH MG: 20 TABLET, FILM COATED ORAL at 20:25

## 2021-03-18 RX ADMIN — SODIUM CHLORIDE PRN MLS/HR: 0.9 INJECTION, SOLUTION INTRAVENOUS at 04:36

## 2021-03-18 RX ADMIN — Medication PRN MG: at 20:43

## 2021-03-18 RX ADMIN — Medication SCH EACH: at 20:32

## 2021-03-18 RX ADMIN — SODIUM CHLORIDE PRN UNIT: 9 INJECTION, SOLUTION INTRAVENOUS at 05:33

## 2021-03-18 RX ADMIN — Medication PRN MG: at 12:41

## 2021-03-18 RX ADMIN — METFORMIN HYDROCHLORIDE SCH MG: 500 TABLET ORAL at 18:00

## 2021-03-18 RX ADMIN — DOCUSATE SODIUM SCH MG: 100 CAPSULE, LIQUID FILLED ORAL at 20:43

## 2021-03-18 NOTE — NUR
Roberta FARRAR would like to transfer the patient back  to [facility] for more monitoring

## 2021-03-18 NOTE — NUR
Patient blood sugar 136, refused insulin, patient stated this is normal for me, risks and 
benefits explained, patient verbalized understanding of it

## 2021-03-18 NOTE — NUR
Received patient lying in bed. AAOX4. In no acute distress. Denies any pain or SOB at this 
time. On RA. Iv site on right hand intact and patent. Abductor pillow between legs. Dressing 
on left hip dry and clean. Needs assessed and attended to. Safety measure initiated and call 
bell within reached.

## 2021-03-18 NOTE — NUR
patient is  alert, oriented  x3, with some forgetfulness, no sob, resp even nonlabored, skin 
warm and dry to touch, left hip ORIF, incision site is clean and dry, staples are intact, 
noted with some blanchable redness to sacral area, no distress noted.

## 2021-03-18 NOTE — NUR
discharge to [facility] cancelled per LENI Granados, patient is stable and can continue ARU

## 2021-03-18 NOTE — NUR
patient more awake and alert now. AAOx4. In no acute distress. Tylenol 650mg PO given x1 for 
complain of pain and effective. On O2 at 3LPM via NC in place. O2 sat at 96%. NSR on tele at 
81/min. IV site on right hand intact and patent. IVF infusing. Abductor pillow between legs. 
Needs attended to and met. Safety measure maintained and call bell within reach.

## 2021-03-18 NOTE — NUR
patient blood sugar 139, refused insulin, patient also said he does not want his metformin 
either. risks and benefits explained, patient verbalized understanding of it.

## 2021-03-19 VITALS — SYSTOLIC BLOOD PRESSURE: 146 MMHG | DIASTOLIC BLOOD PRESSURE: 82 MMHG

## 2021-03-19 VITALS — SYSTOLIC BLOOD PRESSURE: 108 MMHG | DIASTOLIC BLOOD PRESSURE: 62 MMHG

## 2021-03-19 VITALS — SYSTOLIC BLOOD PRESSURE: 139 MMHG | DIASTOLIC BLOOD PRESSURE: 83 MMHG

## 2021-03-19 VITALS — SYSTOLIC BLOOD PRESSURE: 101 MMHG | DIASTOLIC BLOOD PRESSURE: 75 MMHG

## 2021-03-19 LAB
BASOPHILS # BLD AUTO: 0 K/UL (ref 0–8)
BASOPHILS NFR BLD AUTO: 0.8 % (ref 0–2)
BUN SERPL-MCNC: 9 MG/DL (ref 7–18)
CHLORIDE SERPL-SCNC: 100 MMOL/L (ref 98–107)
CHOLEST SERPL-MCNC: 94 MG/DL (ref ?–200)
CO2 SERPL-SCNC: 31 MMOL/L (ref 21–32)
CREAT SERPL-MCNC: 0.7 MG/DL (ref 0.6–1.3)
EOSINOPHIL # BLD AUTO: 0.2 K/UL (ref 0–0.7)
EOSINOPHIL NFR BLD AUTO: 3.3 % (ref 0–7)
GLUCOSE SERPL-MCNC: 219 MG/DL (ref 74–106)
HCT VFR BLD AUTO: 35.1 % (ref 36.7–47.1)
HDLC SERPL-MCNC: 32 MG/DL (ref 40–60)
HGB BLD-MCNC: 12.6 G/DL (ref 12.5–16.3)
LYMPHOCYTES # BLD AUTO: 1.6 K/UL (ref 20–40)
LYMPHOCYTES NFR BLD AUTO: 23.9 % (ref 20.5–51.5)
MAGNESIUM SERPL-MCNC: 1.8 MG/DL (ref 1.8–2.4)
MCH RBC QN AUTO: 33.3 UUG (ref 23.8–33.4)
MCHC RBC AUTO-ENTMCNC: 36 G/DL (ref 32.5–36.3)
MCV RBC AUTO: 92.5 FL (ref 73–96.2)
MONOCYTES # BLD AUTO: 0.4 K/UL (ref 2–10)
MONOCYTES NFR BLD AUTO: 6.4 % (ref 0–11)
NEUTROPHILS # BLD AUTO: 4.3 K/UL (ref 1.8–8.9)
NEUTROPHILS NFR BLD AUTO: 65.6 % (ref 38.5–71.5)
PHOSPHATE SERPL-MCNC: 3.1 MG/DL (ref 2.5–4.9)
PLATELET # BLD AUTO: 123 K/UL (ref 152–348)
POTASSIUM SERPL-SCNC: 4.1 MMOL/L (ref 3.5–5.1)
RBC # BLD AUTO: 3.79 MIL/UL (ref 4.06–5.63)
TRIGL SERPL-MCNC: 87 MG/DL (ref 30–150)
TSH SERPL DL<=0.005 MIU/L-ACNC: 0.77 MIU/ML (ref 0.36–3.74)
WBC # BLD AUTO: 6.5 K/UL (ref 3.6–10.2)

## 2021-03-19 RX ADMIN — TAMSULOSIN HYDROCHLORIDE SCH MG: 0.4 CAPSULE ORAL at 20:29

## 2021-03-19 RX ADMIN — LISINOPRIL SCH MG: 10 TABLET ORAL at 08:10

## 2021-03-19 RX ADMIN — SODIUM CHLORIDE PRN UNIT: 9 INJECTION, SOLUTION INTRAVENOUS at 08:00

## 2021-03-19 RX ADMIN — CLONAZEPAM PRN MG: 1 TABLET ORAL at 00:36

## 2021-03-19 RX ADMIN — Medication SCH EACH: at 06:30

## 2021-03-19 RX ADMIN — Medication SCH MG: at 20:30

## 2021-03-19 RX ADMIN — SODIUM CHLORIDE PRN UNIT: 9 INJECTION, SOLUTION INTRAVENOUS at 16:35

## 2021-03-19 RX ADMIN — DOCUSATE SODIUM SCH MG: 100 CAPSULE, LIQUID FILLED ORAL at 20:29

## 2021-03-19 RX ADMIN — ATORVASTATIN CALCIUM SCH MG: 20 TABLET, FILM COATED ORAL at 20:29

## 2021-03-19 RX ADMIN — Medication SCH EACH: at 11:48

## 2021-03-19 RX ADMIN — Medication SCH MG: at 20:29

## 2021-03-19 RX ADMIN — ANORECTAL OINTMENT SCH GM: 15.7; .44; 24; 20.6 OINTMENT TOPICAL at 21:15

## 2021-03-19 RX ADMIN — CLONAZEPAM PRN MG: 1 TABLET ORAL at 12:01

## 2021-03-19 RX ADMIN — ENOXAPARIN SODIUM SCH MG: 40 INJECTION SUBCUTANEOUS at 09:51

## 2021-03-19 RX ADMIN — SODIUM CHLORIDE PRN UNIT: 9 INJECTION, SOLUTION INTRAVENOUS at 11:48

## 2021-03-19 RX ADMIN — METFORMIN HYDROCHLORIDE SCH MG: 500 TABLET ORAL at 17:25

## 2021-03-19 RX ADMIN — METFORMIN HYDROCHLORIDE SCH MG: 500 TABLET ORAL at 08:00

## 2021-03-19 RX ADMIN — Medication SCH EACH: at 16:41

## 2021-03-19 RX ADMIN — PANTOPRAZOLE SODIUM SCH MG: 40 TABLET, DELAYED RELEASE ORAL at 06:27

## 2021-03-19 NOTE — NUR
patient was very anxious in  the morning,agitated, yelling in the loud voices, saying  he 
wants to smoke, nursing director made aware, patient noted with poor safety awareness, 
ambulates without calling for assist, tries to reach the stuff unsafely, does not want to 
give his ciggeret and lighter the nursing staff for safely reason, Klonopin administered as 
ordered, with some effectiveness. continue to monitor, no other distress noted, alert, 
awake, no sob. resp even nonlabored.

## 2021-03-19 NOTE — NUR
WOUND CARE CONSULT: PT UNCOOPERATIVE, WAVING FIST AND CURSING AT NURSING STAFF. PT IS 
INDEPENDENT WITH BED MOBILITY. PT NOTED TO HAVE MOISTURE ASSOCIATED SKIN DAMAGE TO GLUTEAL 
CREASE AND RT LOWER BUTTOCK, PRESENT ON ADMISSION. RECOMMENDATIONS MADE FOR SKIN PROTECTION. 
DISCUSSED WITH NURSING STAFF. MD IN AGREEMENT WITH PLAN OF CARE. 

-------------------------------------------------------------------------------

Addendum: 03/19/21 at 1137 by NIKI HOPPER RN

-------------------------------------------------------------------------------

Amended: Links added.

## 2021-03-19 NOTE — NUR
Slept well last night. Oxy IR given for complain of pain on left hip and effective. Denies 
SOB on RA. IV site on right hand remain intact and patent. Abductor pillow between legs. 
Dressing on left hip dry and clean. Needs attended to and met. Safety measure maintained and 
call bell within reached.

## 2021-03-20 VITALS — DIASTOLIC BLOOD PRESSURE: 82 MMHG | SYSTOLIC BLOOD PRESSURE: 129 MMHG

## 2021-03-20 VITALS — DIASTOLIC BLOOD PRESSURE: 65 MMHG | SYSTOLIC BLOOD PRESSURE: 111 MMHG

## 2021-03-20 VITALS — SYSTOLIC BLOOD PRESSURE: 99 MMHG | DIASTOLIC BLOOD PRESSURE: 58 MMHG

## 2021-03-20 RX ADMIN — Medication SCH MG: at 08:38

## 2021-03-20 RX ADMIN — HYDROCODONE BITARTRATE AND ACETAMINOPHEN PRN TAB: 10; 325 TABLET ORAL at 00:22

## 2021-03-20 RX ADMIN — CLONAZEPAM PRN MG: 1 TABLET ORAL at 11:26

## 2021-03-20 RX ADMIN — Medication SCH EACH: at 21:00

## 2021-03-20 RX ADMIN — METFORMIN HYDROCHLORIDE SCH MG: 500 TABLET ORAL at 17:45

## 2021-03-20 RX ADMIN — SODIUM CHLORIDE PRN UNIT: 9 INJECTION, SOLUTION INTRAVENOUS at 12:28

## 2021-03-20 RX ADMIN — Medication SCH EACH: at 11:32

## 2021-03-20 RX ADMIN — NICOTINE SCH MG: 7 PATCH TRANSDERMAL at 08:42

## 2021-03-20 RX ADMIN — Medication SCH EACH: at 16:46

## 2021-03-20 RX ADMIN — LISINOPRIL SCH MG: 10 TABLET ORAL at 08:56

## 2021-03-20 RX ADMIN — ANORECTAL OINTMENT SCH APPLIC: 15.7; .44; 24; 20.6 OINTMENT TOPICAL at 08:56

## 2021-03-20 RX ADMIN — TAMSULOSIN HYDROCHLORIDE SCH MG: 0.4 CAPSULE ORAL at 20:55

## 2021-03-20 RX ADMIN — DOCUSATE SODIUM SCH MG: 100 CAPSULE, LIQUID FILLED ORAL at 20:54

## 2021-03-20 RX ADMIN — Medication SCH EACH: at 06:23

## 2021-03-20 RX ADMIN — INSULIN HUMAN PRN UNITS: 100 INJECTION, SOLUTION PARENTERAL at 21:06

## 2021-03-20 RX ADMIN — ATORVASTATIN CALCIUM SCH MG: 20 TABLET, FILM COATED ORAL at 20:55

## 2021-03-20 RX ADMIN — SODIUM CHLORIDE PRN UNIT: 9 INJECTION, SOLUTION INTRAVENOUS at 16:53

## 2021-03-20 RX ADMIN — TEMAZEPAM PRN MG: 15 CAPSULE ORAL at 00:22

## 2021-03-20 RX ADMIN — Medication SCH MG: at 23:21

## 2021-03-20 RX ADMIN — SODIUM CHLORIDE PRN UNIT: 9 INJECTION, SOLUTION INTRAVENOUS at 08:47

## 2021-03-20 RX ADMIN — TEMAZEPAM PRN MG: 15 CAPSULE ORAL at 23:20

## 2021-03-20 RX ADMIN — PANTOPRAZOLE SODIUM SCH MG: 40 TABLET, DELAYED RELEASE ORAL at 06:19

## 2021-03-20 RX ADMIN — METFORMIN HYDROCHLORIDE SCH MG: 500 TABLET ORAL at 08:37

## 2021-03-20 RX ADMIN — SODIUM CHLORIDE PRN UNIT: 9 INJECTION, SOLUTION INTRAVENOUS at 00:15

## 2021-03-20 RX ADMIN — Medication SCH EACH: at 00:13

## 2021-03-20 RX ADMIN — Medication SCH MG: at 20:54

## 2021-03-20 RX ADMIN — METFORMIN HYDROCHLORIDE SCH MG: 500 TABLET ORAL at 17:49

## 2021-03-20 RX ADMIN — ENOXAPARIN SODIUM SCH MG: 40 INJECTION SUBCUTANEOUS at 08:48

## 2021-03-20 RX ADMIN — ANORECTAL OINTMENT SCH APPLIC: 15.7; .44; 24; 20.6 OINTMENT TOPICAL at 20:55

## 2021-03-20 RX ADMIN — Medication SCH MG: at 14:44

## 2021-03-20 RX ADMIN — HYDROCODONE BITARTRATE AND ACETAMINOPHEN PRN TAB: 10; 325 TABLET ORAL at 11:20

## 2021-03-20 NOTE — NUR
Patient in bed alert and oriented x 4, irritable and uncooperative , on room air. VS WNL. 
Complains pain in his left hip and back. Pain medication administered per MD order. Patient 
is irritable and uncooperative he wants to have medications through injection, MD notified 
and order for Oxycontin 40mg every 8 hours instead. All needs met promptly. Will continue to 
monitor.

## 2021-03-20 NOTE — NUR
Received patient in bed, enjoyed playing with his mobile phone, No s/s of pain/discomforts 
at this time. Safety measures and afll prevention maintained. Continue care as planned.

## 2021-03-20 NOTE — NUR
Shift End Report: Very needy, demanding, lack of patience. PRN pain medication was given 
once with effect.  Kept insisting to go out and smoke. Explained reason why he can not go at 
this time again and again. All needs attended and met.  Continue current rehab plan of care.

## 2021-03-21 VITALS — DIASTOLIC BLOOD PRESSURE: 77 MMHG | SYSTOLIC BLOOD PRESSURE: 135 MMHG

## 2021-03-21 VITALS — DIASTOLIC BLOOD PRESSURE: 57 MMHG | SYSTOLIC BLOOD PRESSURE: 91 MMHG

## 2021-03-21 VITALS — SYSTOLIC BLOOD PRESSURE: 106 MMHG | DIASTOLIC BLOOD PRESSURE: 63 MMHG

## 2021-03-21 VITALS — DIASTOLIC BLOOD PRESSURE: 80 MMHG | SYSTOLIC BLOOD PRESSURE: 105 MMHG

## 2021-03-21 RX ADMIN — METFORMIN HYDROCHLORIDE SCH MG: 500 TABLET ORAL at 08:06

## 2021-03-21 RX ADMIN — Medication SCH MG: at 20:51

## 2021-03-21 RX ADMIN — ANORECTAL OINTMENT SCH APPLIC: 15.7; .44; 24; 20.6 OINTMENT TOPICAL at 20:52

## 2021-03-21 RX ADMIN — Medication SCH EACH: at 11:21

## 2021-03-21 RX ADMIN — Medication SCH EACH: at 20:57

## 2021-03-21 RX ADMIN — SODIUM CHLORIDE PRN UNIT: 9 INJECTION, SOLUTION INTRAVENOUS at 07:24

## 2021-03-21 RX ADMIN — Medication SCH MG: at 21:15

## 2021-03-21 RX ADMIN — NICOTINE SCH MG: 7 PATCH TRANSDERMAL at 08:06

## 2021-03-21 RX ADMIN — Medication SCH EACH: at 06:37

## 2021-03-21 RX ADMIN — SODIUM CHLORIDE PRN UNIT: 9 INJECTION, SOLUTION INTRAVENOUS at 11:21

## 2021-03-21 RX ADMIN — ATORVASTATIN CALCIUM SCH MG: 20 TABLET, FILM COATED ORAL at 20:50

## 2021-03-21 RX ADMIN — PANTOPRAZOLE SODIUM SCH MG: 40 TABLET, DELAYED RELEASE ORAL at 06:31

## 2021-03-21 RX ADMIN — ENOXAPARIN SODIUM SCH MG: 40 INJECTION SUBCUTANEOUS at 08:06

## 2021-03-21 RX ADMIN — TAMSULOSIN HYDROCHLORIDE SCH MG: 0.4 CAPSULE ORAL at 21:15

## 2021-03-21 RX ADMIN — ANORECTAL OINTMENT SCH APPLIC: 15.7; .44; 24; 20.6 OINTMENT TOPICAL at 08:07

## 2021-03-21 RX ADMIN — Medication SCH MG: at 06:11

## 2021-03-21 RX ADMIN — DOCUSATE SODIUM SCH MG: 100 CAPSULE, LIQUID FILLED ORAL at 20:51

## 2021-03-21 RX ADMIN — LISINOPRIL SCH MG: 10 TABLET ORAL at 08:06

## 2021-03-21 RX ADMIN — SODIUM CHLORIDE PRN UNIT: 9 INJECTION, SOLUTION INTRAVENOUS at 21:02

## 2021-03-21 RX ADMIN — Medication SCH MG: at 13:11

## 2021-03-21 RX ADMIN — TAMSULOSIN HYDROCHLORIDE SCH MG: 0.4 CAPSULE ORAL at 20:51

## 2021-03-21 RX ADMIN — METFORMIN HYDROCHLORIDE SCH MG: 500 TABLET ORAL at 17:14

## 2021-03-21 RX ADMIN — Medication SCH EACH: at 16:37

## 2021-03-21 RX ADMIN — TEMAZEPAM PRN MG: 15 CAPSULE ORAL at 22:08

## 2021-03-21 NOTE — NUR
Shift End Report: Patient behaved the whole shift. No screaming, yelling, demanding, 
arguments, complaining. Seen up in wheelchair outside his door. Limited conversation with 
staff. No PRN pain medication requested. Continue current rehab plan of care.

## 2021-03-22 VITALS — DIASTOLIC BLOOD PRESSURE: 67 MMHG | SYSTOLIC BLOOD PRESSURE: 103 MMHG

## 2021-03-22 VITALS — DIASTOLIC BLOOD PRESSURE: 76 MMHG | SYSTOLIC BLOOD PRESSURE: 126 MMHG

## 2021-03-22 VITALS — SYSTOLIC BLOOD PRESSURE: 101 MMHG | DIASTOLIC BLOOD PRESSURE: 70 MMHG

## 2021-03-22 VITALS — DIASTOLIC BLOOD PRESSURE: 57 MMHG | SYSTOLIC BLOOD PRESSURE: 115 MMHG

## 2021-03-22 RX ADMIN — Medication SCH MG: at 14:32

## 2021-03-22 RX ADMIN — ANORECTAL OINTMENT SCH APPLIC: 15.7; .44; 24; 20.6 OINTMENT TOPICAL at 21:33

## 2021-03-22 RX ADMIN — METFORMIN HYDROCHLORIDE SCH MG: 500 TABLET ORAL at 08:00

## 2021-03-22 RX ADMIN — Medication SCH MG: at 21:30

## 2021-03-22 RX ADMIN — Medication SCH EACH: at 17:08

## 2021-03-22 RX ADMIN — DOCUSATE SODIUM SCH MG: 100 CAPSULE, LIQUID FILLED ORAL at 21:00

## 2021-03-22 RX ADMIN — Medication SCH EACH: at 12:23

## 2021-03-22 RX ADMIN — ENOXAPARIN SODIUM SCH MG: 40 INJECTION SUBCUTANEOUS at 08:36

## 2021-03-22 RX ADMIN — PANTOPRAZOLE SODIUM SCH MG: 40 TABLET, DELAYED RELEASE ORAL at 07:00

## 2021-03-22 RX ADMIN — ANORECTAL OINTMENT SCH APPLIC: 15.7; .44; 24; 20.6 OINTMENT TOPICAL at 09:08

## 2021-03-22 RX ADMIN — Medication SCH EACH: at 06:59

## 2021-03-22 RX ADMIN — Medication SCH MG: at 05:00

## 2021-03-22 RX ADMIN — SODIUM CHLORIDE PRN UNIT: 9 INJECTION, SOLUTION INTRAVENOUS at 09:55

## 2021-03-22 RX ADMIN — ATORVASTATIN CALCIUM SCH MG: 20 TABLET, FILM COATED ORAL at 21:30

## 2021-03-22 RX ADMIN — Medication SCH EACH: at 21:50

## 2021-03-22 RX ADMIN — LISINOPRIL SCH MG: 10 TABLET ORAL at 08:33

## 2021-03-22 RX ADMIN — METFORMIN HYDROCHLORIDE SCH MG: 500 TABLET ORAL at 17:36

## 2021-03-22 RX ADMIN — SODIUM CHLORIDE PRN UNIT: 9 INJECTION, SOLUTION INTRAVENOUS at 17:21

## 2021-03-22 RX ADMIN — SODIUM CHLORIDE PRN UNIT: 9 INJECTION, SOLUTION INTRAVENOUS at 21:37

## 2021-03-22 RX ADMIN — Medication SCH MG: at 21:31

## 2021-03-22 RX ADMIN — NICOTINE SCH MG: 7 PATCH TRANSDERMAL at 09:00

## 2021-03-22 RX ADMIN — TEMAZEPAM SCH MG: 15 CAPSULE ORAL at 21:49

## 2021-03-22 RX ADMIN — HYDROCODONE BITARTRATE AND ACETAMINOPHEN PRN TAB: 10; 325 TABLET ORAL at 00:35

## 2021-03-22 NOTE — NUR
End of Shift Report: Patient was cooperative the entire shift. Patient is AOx4, on RA sating 
at 96%, ambulates with walker. All medications given as ordered and tolerated well. Patient 
c/o pain on left hip, gave as ordered and PRN pain medication. Patient slept intermittently. 
All needs attended to, safety precautions in place, call light within reach. Will endorse to 
the oncoming shift.

## 2021-03-22 NOTE — NUR
Received patient alert oriented x3, with some forgetfulness, at room air saturating 95% no 
sob, respiration  even nonlabored, skin warm and dry to touch, left hip ORIF, incision site 
is clean and dry, patient ambulating with walker from his room to hallway with supervision 
of staff assisted back to his room, make him comfortable,  routine pain medication with Due 
medication administered as ordered provide quite environment to asleep, Needs anticipated, 
safety precautions observed all time. Call light with in reach.

## 2021-03-22 NOTE — NUR
Patient is alert, oriented x 4, not in any form of distress, on room  air. Patient refused 
metformin, explained risks and benefits but still refused. Roberta Newell NP in the unit 
during the shift, update given and informed regarding refusal of metformin and nicotine 
patch with no new order. Patient seen by Dr. Connelly and MD ordered to change Restoril to 30 
mg PO Q HS.

## 2021-03-23 VITALS — DIASTOLIC BLOOD PRESSURE: 71 MMHG | SYSTOLIC BLOOD PRESSURE: 120 MMHG

## 2021-03-23 VITALS — SYSTOLIC BLOOD PRESSURE: 129 MMHG | DIASTOLIC BLOOD PRESSURE: 74 MMHG

## 2021-03-23 VITALS — DIASTOLIC BLOOD PRESSURE: 81 MMHG | SYSTOLIC BLOOD PRESSURE: 131 MMHG

## 2021-03-23 VITALS — SYSTOLIC BLOOD PRESSURE: 165 MMHG | DIASTOLIC BLOOD PRESSURE: 92 MMHG

## 2021-03-23 RX ADMIN — Medication SCH EACH: at 21:26

## 2021-03-23 RX ADMIN — ANORECTAL OINTMENT SCH APPLIC: 15.7; .44; 24; 20.6 OINTMENT TOPICAL at 09:12

## 2021-03-23 RX ADMIN — Medication SCH EACH: at 12:09

## 2021-03-23 RX ADMIN — Medication SCH MG: at 13:32

## 2021-03-23 RX ADMIN — Medication SCH EACH: at 06:59

## 2021-03-23 RX ADMIN — ATORVASTATIN CALCIUM SCH MG: 20 TABLET, FILM COATED ORAL at 21:23

## 2021-03-23 RX ADMIN — DOCUSATE SODIUM SCH MG: 100 CAPSULE, LIQUID FILLED ORAL at 21:22

## 2021-03-23 RX ADMIN — Medication SCH MG: at 21:22

## 2021-03-23 RX ADMIN — Medication SCH MG: at 21:32

## 2021-03-23 RX ADMIN — METFORMIN HYDROCHLORIDE SCH MG: 500 TABLET ORAL at 18:00

## 2021-03-23 RX ADMIN — SODIUM CHLORIDE PRN UNIT: 9 INJECTION, SOLUTION INTRAVENOUS at 17:45

## 2021-03-23 RX ADMIN — Medication SCH MG: at 06:15

## 2021-03-23 RX ADMIN — LISINOPRIL SCH MG: 10 TABLET ORAL at 09:03

## 2021-03-23 RX ADMIN — INSULIN HUMAN PRN UNITS: 100 INJECTION, SOLUTION PARENTERAL at 21:32

## 2021-03-23 RX ADMIN — SODIUM CHLORIDE PRN UNIT: 9 INJECTION, SOLUTION INTRAVENOUS at 09:12

## 2021-03-23 RX ADMIN — HYDROCODONE BITARTRATE AND ACETAMINOPHEN PRN TAB: 10; 325 TABLET ORAL at 04:02

## 2021-03-23 RX ADMIN — ANORECTAL OINTMENT SCH APPLIC: 15.7; .44; 24; 20.6 OINTMENT TOPICAL at 21:23

## 2021-03-23 RX ADMIN — TAMSULOSIN HYDROCHLORIDE SCH MG: 0.4 CAPSULE ORAL at 21:22

## 2021-03-23 RX ADMIN — ENOXAPARIN SODIUM SCH MG: 40 INJECTION SUBCUTANEOUS at 09:03

## 2021-03-23 RX ADMIN — PANTOPRAZOLE SODIUM SCH MG: 40 TABLET, DELAYED RELEASE ORAL at 06:16

## 2021-03-23 RX ADMIN — METFORMIN HYDROCHLORIDE SCH MG: 500 TABLET ORAL at 08:00

## 2021-03-23 RX ADMIN — TEMAZEPAM SCH MG: 15 CAPSULE ORAL at 21:23

## 2021-03-23 RX ADMIN — Medication SCH EACH: at 17:41

## 2021-03-23 NOTE — NUR
received pt screaming, yelling, demanding to go outside to spoke a cigarette. Spoke with 
charge nurse and according to her hospital policy does not allow pt to smoke at night. 
Explained to the pt, try to calm him down and walked him back to his room. All due 
medications administered and tolerated well. PRN Norco administered pt complaint of pain. 
Snacks proved. Dressing clean, dry and intact. Sacral wound care completed. Picture taken, 
placed in chart. Walking in hallway with wheel chair. Safety measures maintained. Pt has 
been very anxious throughout the night. Episodes of up and down. Will endorse to oncoming 
nurse. Continue plan of care.

## 2021-03-23 NOTE — NUR
Patient woke up with c/o of pain at his back on PS 8/10 assist with proper positing for 
resting in bed,administered PRN Norco 10/325 mg Q 8 hours as ordered. Encouraged fluids  as 
tolerated. Needs anticipated. Will reassess for relief of pain,  Continue with current plan 
of care.

## 2021-03-24 VITALS — DIASTOLIC BLOOD PRESSURE: 80 MMHG | SYSTOLIC BLOOD PRESSURE: 127 MMHG

## 2021-03-24 VITALS — SYSTOLIC BLOOD PRESSURE: 119 MMHG | DIASTOLIC BLOOD PRESSURE: 77 MMHG

## 2021-03-24 VITALS — DIASTOLIC BLOOD PRESSURE: 74 MMHG | SYSTOLIC BLOOD PRESSURE: 118 MMHG

## 2021-03-24 VITALS — DIASTOLIC BLOOD PRESSURE: 73 MMHG | SYSTOLIC BLOOD PRESSURE: 105 MMHG

## 2021-03-24 LAB
BASOPHILS # BLD AUTO: 0 K/UL (ref 0–8)
BASOPHILS NFR BLD AUTO: 0.9 % (ref 0–2)
BUN SERPL-MCNC: 13 MG/DL (ref 7–18)
CHLORIDE SERPL-SCNC: 100 MMOL/L (ref 98–107)
CO2 SERPL-SCNC: 30 MMOL/L (ref 21–32)
CREAT SERPL-MCNC: 0.8 MG/DL (ref 0.6–1.3)
EOSINOPHIL # BLD AUTO: 0.3 K/UL (ref 0–0.7)
EOSINOPHIL NFR BLD AUTO: 4.9 % (ref 0–7)
GLUCOSE SERPL-MCNC: 169 MG/DL (ref 74–106)
HCT VFR BLD AUTO: 32.8 % (ref 36.7–47.1)
HGB BLD-MCNC: 11.5 G/DL (ref 12.5–16.3)
LYMPHOCYTES # BLD AUTO: 1.6 K/UL (ref 20–40)
LYMPHOCYTES NFR BLD AUTO: 30.7 % (ref 20.5–51.5)
MCH RBC QN AUTO: 33 UUG (ref 23.8–33.4)
MCHC RBC AUTO-ENTMCNC: 35 G/DL (ref 32.5–36.3)
MCV RBC AUTO: 94.1 FL (ref 73–96.2)
MONOCYTES # BLD AUTO: 0.6 K/UL (ref 2–10)
MONOCYTES NFR BLD AUTO: 11.1 % (ref 0–11)
NEUTROPHILS # BLD AUTO: 2.7 K/UL (ref 1.8–8.9)
NEUTROPHILS NFR BLD AUTO: 52.4 % (ref 38.5–71.5)
PLATELET # BLD AUTO: 167 K/UL (ref 152–348)
POTASSIUM SERPL-SCNC: 3.9 MMOL/L (ref 3.5–5.1)
RBC # BLD AUTO: 3.49 MIL/UL (ref 4.06–5.63)
WBC # BLD AUTO: 5.1 K/UL (ref 3.6–10.2)

## 2021-03-24 RX ADMIN — SODIUM CHLORIDE PRN UNIT: 9 INJECTION, SOLUTION INTRAVENOUS at 08:33

## 2021-03-24 RX ADMIN — HYDROCODONE BITARTRATE AND ACETAMINOPHEN PRN TAB: 10; 325 TABLET ORAL at 00:33

## 2021-03-24 RX ADMIN — Medication SCH MG: at 05:18

## 2021-03-24 RX ADMIN — TAMSULOSIN HYDROCHLORIDE SCH MG: 0.4 CAPSULE ORAL at 21:00

## 2021-03-24 RX ADMIN — ANORECTAL OINTMENT SCH APPLIC: 15.7; .44; 24; 20.6 OINTMENT TOPICAL at 21:34

## 2021-03-24 RX ADMIN — METFORMIN HYDROCHLORIDE SCH MG: 500 TABLET ORAL at 18:00

## 2021-03-24 RX ADMIN — Medication SCH EACH: at 21:42

## 2021-03-24 RX ADMIN — Medication SCH EACH: at 06:18

## 2021-03-24 RX ADMIN — Medication SCH MG: at 21:48

## 2021-03-24 RX ADMIN — ATORVASTATIN CALCIUM SCH MG: 20 TABLET, FILM COATED ORAL at 21:00

## 2021-03-24 RX ADMIN — HYDROCODONE BITARTRATE AND ACETAMINOPHEN PRN TAB: 10; 325 TABLET ORAL at 08:59

## 2021-03-24 RX ADMIN — DOCUSATE SODIUM SCH MG: 100 CAPSULE, LIQUID FILLED ORAL at 21:00

## 2021-03-24 RX ADMIN — LISINOPRIL SCH MG: 10 TABLET ORAL at 08:58

## 2021-03-24 RX ADMIN — SODIUM CHLORIDE PRN UNIT: 9 INJECTION, SOLUTION INTRAVENOUS at 12:10

## 2021-03-24 RX ADMIN — Medication SCH MG: at 14:11

## 2021-03-24 RX ADMIN — METFORMIN HYDROCHLORIDE SCH MG: 500 TABLET ORAL at 08:54

## 2021-03-24 RX ADMIN — CLONAZEPAM PRN MG: 1 TABLET ORAL at 16:22

## 2021-03-24 RX ADMIN — ENOXAPARIN SODIUM SCH MG: 40 INJECTION SUBCUTANEOUS at 09:01

## 2021-03-24 RX ADMIN — TEMAZEPAM SCH MG: 15 CAPSULE ORAL at 21:33

## 2021-03-24 RX ADMIN — ANORECTAL OINTMENT SCH APPLIC: 15.7; .44; 24; 20.6 OINTMENT TOPICAL at 09:01

## 2021-03-24 RX ADMIN — Medication SCH MG: at 21:34

## 2021-03-24 RX ADMIN — SODIUM CHLORIDE PRN UNIT: 9 INJECTION, SOLUTION INTRAVENOUS at 17:41

## 2021-03-24 RX ADMIN — PANTOPRAZOLE SODIUM SCH MG: 40 TABLET, DELAYED RELEASE ORAL at 06:12

## 2021-03-24 RX ADMIN — Medication SCH EACH: at 11:36

## 2021-03-24 RX ADMIN — INSULIN HUMAN PRN UNITS: 100 INJECTION, SOLUTION PARENTERAL at 21:50

## 2021-03-24 RX ADMIN — Medication SCH EACH: at 16:38

## 2021-03-24 NOTE — NUR
EOSS: Pt in assigned room, A&Ox4, calm at this time. Able to verbalize needs throughout 
shift, needs met. Pt reported pain this AM, PRN Norco administered per order, no a/r noted. 
Due medications given per order, no a/r noted. Wound treatment administered per order. L hip 
dressing changed, C/D/I. L hip Xray today, see results in chart. Pt able to ambulate with 
FWW and assistance. Safety measures and fall precautions maintained. Call light and 
belongings within reach. Will endorse care to night shift nurse.

## 2021-03-24 NOTE — NUR
Pt refused 2100 medications Lipitor, Flomax, colace, Humulin R insulin qHS. Educated on the 
risks of not taking the medications, pt still refused. Will continue to monitor.

## 2021-03-24 NOTE — NUR
Received pt in bed, awake and verbally responsive. able to  make needs known. Denies any 
pain or discomfort at this time. Insisted on going outside to smoke. Assisted back to his 
room and instructed on safety measures regarding smoking. Safety measures initiated, call 
light within reach, will continue to monitor.

## 2021-03-25 VITALS — SYSTOLIC BLOOD PRESSURE: 115 MMHG | DIASTOLIC BLOOD PRESSURE: 73 MMHG

## 2021-03-25 VITALS — SYSTOLIC BLOOD PRESSURE: 136 MMHG | DIASTOLIC BLOOD PRESSURE: 74 MMHG

## 2021-03-25 VITALS — SYSTOLIC BLOOD PRESSURE: 123 MMHG | DIASTOLIC BLOOD PRESSURE: 61 MMHG

## 2021-03-25 VITALS — DIASTOLIC BLOOD PRESSURE: 73 MMHG | SYSTOLIC BLOOD PRESSURE: 129 MMHG

## 2021-03-25 RX ADMIN — TAMSULOSIN HYDROCHLORIDE SCH MG: 0.4 CAPSULE ORAL at 20:33

## 2021-03-25 RX ADMIN — ENOXAPARIN SODIUM SCH MG: 40 INJECTION SUBCUTANEOUS at 08:30

## 2021-03-25 RX ADMIN — HYDROCODONE BITARTRATE AND ACETAMINOPHEN PRN TAB: 10; 325 TABLET ORAL at 03:50

## 2021-03-25 RX ADMIN — METFORMIN HYDROCHLORIDE SCH MG: 500 TABLET ORAL at 08:00

## 2021-03-25 RX ADMIN — Medication SCH EACH: at 11:30

## 2021-03-25 RX ADMIN — PANTOPRAZOLE SODIUM SCH MG: 40 TABLET, DELAYED RELEASE ORAL at 06:34

## 2021-03-25 RX ADMIN — Medication SCH MG: at 13:55

## 2021-03-25 RX ADMIN — Medication SCH EACH: at 06:34

## 2021-03-25 RX ADMIN — Medication SCH EACH: at 16:33

## 2021-03-25 RX ADMIN — DOCUSATE SODIUM SCH MG: 100 CAPSULE, LIQUID FILLED ORAL at 20:32

## 2021-03-25 RX ADMIN — Medication SCH EACH: at 20:41

## 2021-03-25 RX ADMIN — TEMAZEPAM SCH MG: 15 CAPSULE ORAL at 20:33

## 2021-03-25 RX ADMIN — Medication SCH MG: at 21:15

## 2021-03-25 RX ADMIN — METFORMIN HYDROCHLORIDE SCH MG: 500 TABLET ORAL at 17:14

## 2021-03-25 RX ADMIN — ATORVASTATIN CALCIUM SCH MG: 20 TABLET, FILM COATED ORAL at 20:33

## 2021-03-25 RX ADMIN — Medication SCH MG: at 06:27

## 2021-03-25 RX ADMIN — ANORECTAL OINTMENT SCH APPLIC: 15.7; .44; 24; 20.6 OINTMENT TOPICAL at 08:31

## 2021-03-25 RX ADMIN — Medication SCH MG: at 20:33

## 2021-03-25 RX ADMIN — ANORECTAL OINTMENT SCH APPLIC: 15.7; .44; 24; 20.6 OINTMENT TOPICAL at 20:33

## 2021-03-25 RX ADMIN — HYDROCODONE BITARTRATE AND ACETAMINOPHEN PRN TAB: 10; 325 TABLET ORAL at 16:48

## 2021-03-25 RX ADMIN — LISINOPRIL SCH MG: 10 TABLET ORAL at 08:30

## 2021-03-25 NOTE — NUR
Patient refused  insulin,  blood sugar is 148, no signs and symptom of hyperglyemia or 
hypoglycemia noted, risks and benefits explained, patient still refused. patient verbalized 
understanding of it

## 2021-03-25 NOTE — NUR
patent blood sugar 144, refused insulin, risks and benefits explained, patient verbalized 
understanding of it

## 2021-03-26 VITALS — DIASTOLIC BLOOD PRESSURE: 76 MMHG | SYSTOLIC BLOOD PRESSURE: 117 MMHG

## 2021-03-26 VITALS — DIASTOLIC BLOOD PRESSURE: 63 MMHG | SYSTOLIC BLOOD PRESSURE: 105 MMHG

## 2021-03-26 VITALS — DIASTOLIC BLOOD PRESSURE: 65 MMHG | SYSTOLIC BLOOD PRESSURE: 106 MMHG

## 2021-03-26 VITALS — SYSTOLIC BLOOD PRESSURE: 119 MMHG | DIASTOLIC BLOOD PRESSURE: 78 MMHG

## 2021-03-26 RX ADMIN — PANTOPRAZOLE SODIUM SCH MG: 40 TABLET, DELAYED RELEASE ORAL at 06:22

## 2021-03-26 RX ADMIN — LISINOPRIL SCH MG: 10 TABLET ORAL at 09:21

## 2021-03-26 RX ADMIN — ATORVASTATIN CALCIUM SCH MG: 20 TABLET, FILM COATED ORAL at 20:21

## 2021-03-26 RX ADMIN — Medication SCH EACH: at 20:24

## 2021-03-26 RX ADMIN — ANORECTAL OINTMENT SCH APPLIC: 15.7; .44; 24; 20.6 OINTMENT TOPICAL at 09:22

## 2021-03-26 RX ADMIN — ENOXAPARIN SODIUM SCH MG: 40 INJECTION SUBCUTANEOUS at 09:00

## 2021-03-26 RX ADMIN — Medication SCH EACH: at 11:47

## 2021-03-26 RX ADMIN — TEMAZEPAM SCH MG: 15 CAPSULE ORAL at 20:20

## 2021-03-26 RX ADMIN — TAMSULOSIN HYDROCHLORIDE SCH MG: 0.4 CAPSULE ORAL at 21:00

## 2021-03-26 RX ADMIN — PANTOPRAZOLE SODIUM SCH MG: 40 TABLET, DELAYED RELEASE ORAL at 07:00

## 2021-03-26 RX ADMIN — Medication SCH MG: at 20:20

## 2021-03-26 RX ADMIN — INSULIN HUMAN PRN UNITS: 100 INJECTION, SOLUTION PARENTERAL at 20:52

## 2021-03-26 RX ADMIN — Medication SCH EACH: at 06:25

## 2021-03-26 RX ADMIN — Medication SCH MG: at 21:22

## 2021-03-26 RX ADMIN — ANORECTAL OINTMENT SCH APPLIC: 15.7; .44; 24; 20.6 OINTMENT TOPICAL at 20:21

## 2021-03-26 RX ADMIN — Medication SCH EACH: at 16:45

## 2021-03-26 RX ADMIN — Medication SCH MG: at 06:03

## 2021-03-26 RX ADMIN — Medication SCH MG: at 14:36

## 2021-03-26 RX ADMIN — SODIUM CHLORIDE PRN UNIT: 9 INJECTION, SOLUTION INTRAVENOUS at 11:52

## 2021-03-26 RX ADMIN — METFORMIN HYDROCHLORIDE SCH MG: 500 TABLET ORAL at 08:00

## 2021-03-26 RX ADMIN — TAMSULOSIN HYDROCHLORIDE SCH MG: 0.4 CAPSULE ORAL at 20:21

## 2021-03-26 RX ADMIN — ATORVASTATIN CALCIUM SCH MG: 20 TABLET, FILM COATED ORAL at 21:00

## 2021-03-26 RX ADMIN — CLONAZEPAM PRN MG: 1 TABLET ORAL at 23:34

## 2021-03-26 RX ADMIN — METFORMIN HYDROCHLORIDE SCH MG: 500 TABLET ORAL at 17:30

## 2021-03-26 RX ADMIN — DOCUSATE SODIUM SCH MG: 100 CAPSULE, LIQUID FILLED ORAL at 20:20

## 2021-03-26 RX ADMIN — DOCUSATE SODIUM SCH MG: 100 CAPSULE, LIQUID FILLED ORAL at 21:00

## 2021-03-26 NOTE — NUR
Shift End Report: VS stable. Patient very needy, demanding and verbally abusive. Repeatedly  
demands to go smoke, that we are violating his right. Explained to patient the hospital 
rules and policies but refused to listen. Charge nurse made aware. Close monitoring 
rendered. Continue current rehab plan of care.

## 2021-03-27 VITALS — DIASTOLIC BLOOD PRESSURE: 71 MMHG | SYSTOLIC BLOOD PRESSURE: 113 MMHG

## 2021-03-27 VITALS — DIASTOLIC BLOOD PRESSURE: 72 MMHG | SYSTOLIC BLOOD PRESSURE: 125 MMHG

## 2021-03-27 VITALS — DIASTOLIC BLOOD PRESSURE: 72 MMHG | SYSTOLIC BLOOD PRESSURE: 122 MMHG

## 2021-03-27 VITALS — SYSTOLIC BLOOD PRESSURE: 119 MMHG | DIASTOLIC BLOOD PRESSURE: 73 MMHG

## 2021-03-27 LAB
BASOPHILS # BLD AUTO: 0 K/UL (ref 0–8)
BASOPHILS NFR BLD AUTO: 0.9 % (ref 0–2)
BUN SERPL-MCNC: 20 MG/DL (ref 7–18)
CHLORIDE SERPL-SCNC: 101 MMOL/L (ref 98–107)
CO2 SERPL-SCNC: 30 MMOL/L (ref 21–32)
CREAT SERPL-MCNC: 0.8 MG/DL (ref 0.6–1.3)
EOSINOPHIL # BLD AUTO: 0.3 K/UL (ref 0–0.7)
EOSINOPHIL NFR BLD AUTO: 6.9 % (ref 0–7)
GLUCOSE SERPL-MCNC: 132 MG/DL (ref 74–106)
HCT VFR BLD AUTO: 34.5 % (ref 36.7–47.1)
HGB BLD-MCNC: 11.6 G/DL (ref 12.5–16.3)
LYMPHOCYTES # BLD AUTO: 1.4 K/UL (ref 20–40)
LYMPHOCYTES NFR BLD AUTO: 29.5 % (ref 20.5–51.5)
MCH RBC QN AUTO: 32.1 UUG (ref 23.8–33.4)
MCHC RBC AUTO-ENTMCNC: 34 G/DL (ref 32.5–36.3)
MCV RBC AUTO: 95.2 FL (ref 73–96.2)
MONOCYTES # BLD AUTO: 0.6 K/UL (ref 2–10)
MONOCYTES NFR BLD AUTO: 12.8 % (ref 0–11)
NEUTROPHILS # BLD AUTO: 2.5 K/UL (ref 1.8–8.9)
NEUTROPHILS NFR BLD AUTO: 49.9 % (ref 38.5–71.5)
PLATELET # BLD AUTO: 161 K/UL (ref 152–348)
POTASSIUM SERPL-SCNC: 4.8 MMOL/L (ref 3.5–5.1)
RBC # BLD AUTO: 3.63 MIL/UL (ref 4.06–5.63)
WBC # BLD AUTO: 4.9 K/UL (ref 3.6–10.2)

## 2021-03-27 RX ADMIN — ANORECTAL OINTMENT SCH APPLIC: 15.7; .44; 24; 20.6 OINTMENT TOPICAL at 08:31

## 2021-03-27 RX ADMIN — METFORMIN HYDROCHLORIDE SCH MG: 500 TABLET ORAL at 08:00

## 2021-03-27 RX ADMIN — HYDROCODONE BITARTRATE AND ACETAMINOPHEN PRN TAB: 10; 325 TABLET ORAL at 17:27

## 2021-03-27 RX ADMIN — TEMAZEPAM SCH MG: 15 CAPSULE ORAL at 21:14

## 2021-03-27 RX ADMIN — LISINOPRIL SCH MG: 10 TABLET ORAL at 08:23

## 2021-03-27 RX ADMIN — ANORECTAL OINTMENT SCH APPLIC: 15.7; .44; 24; 20.6 OINTMENT TOPICAL at 21:00

## 2021-03-27 RX ADMIN — Medication SCH MG: at 05:38

## 2021-03-27 RX ADMIN — Medication SCH MG: at 13:44

## 2021-03-27 RX ADMIN — ATORVASTATIN CALCIUM SCH MG: 20 TABLET, FILM COATED ORAL at 21:14

## 2021-03-27 RX ADMIN — Medication SCH EACH: at 11:30

## 2021-03-27 RX ADMIN — Medication SCH EACH: at 06:51

## 2021-03-27 RX ADMIN — ENOXAPARIN SODIUM SCH MG: 40 INJECTION SUBCUTANEOUS at 08:26

## 2021-03-27 RX ADMIN — Medication SCH MG: at 21:14

## 2021-03-27 RX ADMIN — METFORMIN HYDROCHLORIDE SCH MG: 500 TABLET ORAL at 17:27

## 2021-03-27 RX ADMIN — Medication SCH EACH: at 21:00

## 2021-03-27 RX ADMIN — CLONAZEPAM PRN MG: 1 TABLET ORAL at 22:02

## 2021-03-27 RX ADMIN — Medication SCH EACH: at 17:27

## 2021-03-27 RX ADMIN — TAMSULOSIN HYDROCHLORIDE SCH MG: 0.4 CAPSULE ORAL at 21:00

## 2021-03-27 RX ADMIN — SODIUM CHLORIDE PRN UNIT: 9 INJECTION, SOLUTION INTRAVENOUS at 17:48

## 2021-03-27 RX ADMIN — HYDROCODONE BITARTRATE AND ACETAMINOPHEN PRN TAB: 10; 325 TABLET ORAL at 03:51

## 2021-03-27 RX ADMIN — Medication SCH MG: at 22:19

## 2021-03-27 RX ADMIN — PANTOPRAZOLE SODIUM SCH MG: 40 TABLET, DELAYED RELEASE ORAL at 06:42

## 2021-03-27 RX ADMIN — DOCUSATE SODIUM SCH MG: 100 CAPSULE, LIQUID FILLED ORAL at 21:00

## 2021-03-27 NOTE — NUR
Shift End Report: slept good after taking his trazodone and Restoril. Up early ambulating in 
the [address] with FWW. All needs attended and met. No significant event reported all night. 
Continue current rehab plan of care.

## 2021-03-27 NOTE — NUR
Received patient walking in the hallway with a walker, assisted him back to his bedroom and 
remind him not to walk by himself for his safety. On frequent visual observation as patient 
walks in the hallway with his walker alone. Call light placed within reach. All needs met 
promptly. denies of any pain at this time. VS WNL. All due meds given per MD order. @0921 
patient went off the unit for therapy. Will continue to monitor.

## 2021-03-27 NOTE — NUR
Dressing change done on his right buttocks with no redness noted and applied mepilex on the 
sacral area for skin maintenance. Patient refused HIS BLOOD SUGAR TO BE CHECK AT 1130am . 
Explain to the patient the importance of checking his blood sugar but patient was very 
uncooperative and started raising his voice and stated "YOU CAN CHECK IT LATER IN THE 
AFTERNOON"

## 2021-03-27 NOTE — NUR
Received pt resting in bed. AAO x4. No acute distress noted. Pt ambulated in the hallway 
using walker and safely back in bed. Pt noted to be demanding and uncooperative with plan of 
care and medications. Pt refused colace, flomax, z-guard, and accucheck. Pt also refused 
wound care. Risks and benefits explained. Offered x3, pt still refused. Safety measures 
maintained. Call light and personal items within reach. Will continue to monitor.

## 2021-03-28 VITALS — SYSTOLIC BLOOD PRESSURE: 113 MMHG | DIASTOLIC BLOOD PRESSURE: 62 MMHG

## 2021-03-28 VITALS — SYSTOLIC BLOOD PRESSURE: 121 MMHG | DIASTOLIC BLOOD PRESSURE: 69 MMHG

## 2021-03-28 RX ADMIN — ANORECTAL OINTMENT SCH APPLIC: 15.7; .44; 24; 20.6 OINTMENT TOPICAL at 08:08

## 2021-03-28 RX ADMIN — PANTOPRAZOLE SODIUM SCH MG: 40 TABLET, DELAYED RELEASE ORAL at 06:01

## 2021-03-28 RX ADMIN — Medication SCH MG: at 20:28

## 2021-03-28 RX ADMIN — DOCUSATE SODIUM SCH MG: 100 CAPSULE, LIQUID FILLED ORAL at 20:29

## 2021-03-28 RX ADMIN — Medication SCH EACH: at 11:30

## 2021-03-28 RX ADMIN — CLONAZEPAM PRN MG: 1 TABLET ORAL at 21:28

## 2021-03-28 RX ADMIN — Medication SCH MG: at 05:58

## 2021-03-28 RX ADMIN — TEMAZEPAM SCH MG: 15 CAPSULE ORAL at 20:28

## 2021-03-28 RX ADMIN — ATORVASTATIN CALCIUM SCH MG: 20 TABLET, FILM COATED ORAL at 20:29

## 2021-03-28 RX ADMIN — METFORMIN HYDROCHLORIDE SCH MG: 500 TABLET ORAL at 17:56

## 2021-03-28 RX ADMIN — ANORECTAL OINTMENT SCH APPLIC: 15.7; .44; 24; 20.6 OINTMENT TOPICAL at 20:29

## 2021-03-28 RX ADMIN — Medication SCH MG: at 13:58

## 2021-03-28 RX ADMIN — HYDROCODONE BITARTRATE AND ACETAMINOPHEN PRN TAB: 10; 325 TABLET ORAL at 16:52

## 2021-03-28 RX ADMIN — Medication SCH MG: at 22:04

## 2021-03-28 RX ADMIN — TAMSULOSIN HYDROCHLORIDE SCH MG: 0.4 CAPSULE ORAL at 20:29

## 2021-03-28 RX ADMIN — ENOXAPARIN SODIUM SCH MG: 40 INJECTION SUBCUTANEOUS at 08:07

## 2021-03-28 RX ADMIN — Medication SCH EACH: at 20:29

## 2021-03-28 RX ADMIN — METFORMIN HYDROCHLORIDE SCH MG: 500 TABLET ORAL at 08:00

## 2021-03-28 RX ADMIN — Medication SCH EACH: at 06:56

## 2021-03-28 RX ADMIN — HYDROCODONE BITARTRATE AND ACETAMINOPHEN PRN TAB: 10; 325 TABLET ORAL at 02:55

## 2021-03-28 RX ADMIN — LISINOPRIL SCH MG: 10 TABLET ORAL at 08:05

## 2021-03-28 RX ADMIN — Medication SCH EACH: at 16:57

## 2021-03-28 NOTE — NUR
Patient in bed alert and oriented x 4, denies of any pain at this time. Patient is demanding 
with regards to his food. Remind patient not to walk by himself and use a call light when 
needed but patient is very uncooperative. Refused Lovenox, metformin and z-guard and 
explained to the patient the benefits of taking these medications, patient raised his voice 
and refused.

## 2021-03-28 NOTE — NUR
Patient was escorted at the lobby to  clothes given by his friend. A pack of 
cigarette and 2 pieces of lighters were found in his possession. When asked to surrender the 
cigarette and lighters per hospital protocol patient became agitated and started yelling at 
the hallway. Patient surrender the 2 lighters but not the cigarette. Patient is very 
uncooperative and he was assisted back to his room and patient closed the door.

## 2021-03-28 NOTE — NUR
Received pt ambulating in the hallway with walker. AAO x3-4. No acute distress noted. Pt 
demanding and uncooperative in plan of care. Refused most scheduled medications. Risks and 
benefits explained, offered x3, pt refused. Pt tends to be agitated when teaching and 
explanation are given. Encouraged pt not to ambulate alone and to call for help for safety. 
Safety measures maintained. Call light and personal items within use. Will continue to 
monitor.

## 2021-03-29 VITALS — SYSTOLIC BLOOD PRESSURE: 145 MMHG | DIASTOLIC BLOOD PRESSURE: 83 MMHG

## 2021-03-29 VITALS — DIASTOLIC BLOOD PRESSURE: 81 MMHG | SYSTOLIC BLOOD PRESSURE: 115 MMHG

## 2021-03-29 VITALS — DIASTOLIC BLOOD PRESSURE: 76 MMHG | SYSTOLIC BLOOD PRESSURE: 128 MMHG

## 2021-03-29 LAB
BASOPHILS # BLD AUTO: 0.1 K/UL (ref 0–8)
BASOPHILS NFR BLD AUTO: 2.4 % (ref 0–2)
BUN SERPL-MCNC: 21 MG/DL (ref 7–18)
CHLORIDE SERPL-SCNC: 105 MMOL/L (ref 98–107)
CO2 SERPL-SCNC: 29 MMOL/L (ref 21–32)
CREAT SERPL-MCNC: 0.9 MG/DL (ref 0.6–1.3)
EOSINOPHIL # BLD AUTO: 0.4 K/UL (ref 0–0.7)
EOSINOPHIL NFR BLD AUTO: 6.4 % (ref 0–7)
GLUCOSE SERPL-MCNC: 162 MG/DL (ref 74–106)
HCT VFR BLD AUTO: 36.2 % (ref 36.7–47.1)
HGB BLD-MCNC: 12.2 G/DL (ref 12.5–16.3)
LYMPHOCYTES # BLD AUTO: 1.4 K/UL (ref 20–40)
LYMPHOCYTES NFR BLD AUTO: 24.7 % (ref 20.5–51.5)
MCH RBC QN AUTO: 32.2 UUG (ref 23.8–33.4)
MCHC RBC AUTO-ENTMCNC: 34 G/DL (ref 32.5–36.3)
MCV RBC AUTO: 95.8 FL (ref 73–96.2)
MONOCYTES # BLD AUTO: 0.6 K/UL (ref 2–10)
MONOCYTES NFR BLD AUTO: 10.3 % (ref 0–11)
NEUTROPHILS # BLD AUTO: 3.2 K/UL (ref 1.8–8.9)
NEUTROPHILS NFR BLD AUTO: 56.2 % (ref 38.5–71.5)
PLATELET # BLD AUTO: 166 K/UL (ref 152–348)
POTASSIUM SERPL-SCNC: 4.4 MMOL/L (ref 3.5–5.1)
RBC # BLD AUTO: 3.78 MIL/UL (ref 4.06–5.63)
WBC # BLD AUTO: 5.6 K/UL (ref 3.6–10.2)

## 2021-03-29 RX ADMIN — ANORECTAL OINTMENT SCH APPLIC: 15.7; .44; 24; 20.6 OINTMENT TOPICAL at 20:27

## 2021-03-29 RX ADMIN — DOCUSATE SODIUM SCH MG: 100 CAPSULE, LIQUID FILLED ORAL at 20:21

## 2021-03-29 RX ADMIN — Medication SCH MG: at 05:08

## 2021-03-29 RX ADMIN — METFORMIN HYDROCHLORIDE SCH MG: 500 TABLET ORAL at 08:00

## 2021-03-29 RX ADMIN — Medication SCH MG: at 21:23

## 2021-03-29 RX ADMIN — TEMAZEPAM SCH MG: 15 CAPSULE ORAL at 20:21

## 2021-03-29 RX ADMIN — Medication SCH EACH: at 20:21

## 2021-03-29 RX ADMIN — Medication SCH MG: at 20:21

## 2021-03-29 RX ADMIN — METFORMIN HYDROCHLORIDE SCH MG: 500 TABLET ORAL at 17:10

## 2021-03-29 RX ADMIN — INSULIN HUMAN PRN UNITS: 100 INJECTION, SOLUTION PARENTERAL at 20:22

## 2021-03-29 RX ADMIN — CLONAZEPAM PRN MG: 1 TABLET ORAL at 21:23

## 2021-03-29 RX ADMIN — ENOXAPARIN SODIUM SCH MG: 40 INJECTION SUBCUTANEOUS at 09:05

## 2021-03-29 RX ADMIN — Medication SCH EACH: at 11:57

## 2021-03-29 RX ADMIN — ATORVASTATIN CALCIUM SCH MG: 20 TABLET, FILM COATED ORAL at 20:21

## 2021-03-29 RX ADMIN — PANTOPRAZOLE SODIUM SCH MG: 40 TABLET, DELAYED RELEASE ORAL at 06:03

## 2021-03-29 RX ADMIN — Medication SCH EACH: at 06:51

## 2021-03-29 RX ADMIN — TAMSULOSIN HYDROCHLORIDE SCH MG: 0.4 CAPSULE ORAL at 20:21

## 2021-03-29 RX ADMIN — ANORECTAL OINTMENT SCH APPLIC: 15.7; .44; 24; 20.6 OINTMENT TOPICAL at 09:12

## 2021-03-29 RX ADMIN — HYDROCODONE BITARTRATE AND ACETAMINOPHEN PRN TAB: 10; 325 TABLET ORAL at 17:03

## 2021-03-29 RX ADMIN — LISINOPRIL SCH MG: 10 TABLET ORAL at 09:11

## 2021-03-29 RX ADMIN — Medication SCH MG: at 13:45

## 2021-03-29 RX ADMIN — Medication SCH EACH: at 16:30

## 2021-03-29 NOTE — NUR
Patient decided to go home tomorrow and wants to see Dr. Wyman. Patient was asking when to 
remove his staples on his left hip. Dr. Wyman notified and ordered to remove the staples 
on his left hip now and stated that he will try to see the patient.

## 2021-03-30 VITALS — SYSTOLIC BLOOD PRESSURE: 110 MMHG | DIASTOLIC BLOOD PRESSURE: 64 MMHG

## 2021-03-30 VITALS — SYSTOLIC BLOOD PRESSURE: 116 MMHG | DIASTOLIC BLOOD PRESSURE: 75 MMHG

## 2021-03-30 RX ADMIN — ANORECTAL OINTMENT SCH APPLIC: 15.7; .44; 24; 20.6 OINTMENT TOPICAL at 08:43

## 2021-03-30 RX ADMIN — HYDROCODONE BITARTRATE AND ACETAMINOPHEN PRN TAB: 10; 325 TABLET ORAL at 00:48

## 2021-03-30 RX ADMIN — Medication SCH MG: at 06:00

## 2021-03-30 RX ADMIN — METFORMIN HYDROCHLORIDE SCH MG: 500 TABLET ORAL at 08:00

## 2021-03-30 RX ADMIN — Medication SCH EACH: at 06:36

## 2021-03-30 RX ADMIN — ENOXAPARIN SODIUM SCH MG: 40 INJECTION SUBCUTANEOUS at 08:42

## 2021-03-30 RX ADMIN — LISINOPRIL SCH MG: 10 TABLET ORAL at 08:37

## 2021-03-30 RX ADMIN — PANTOPRAZOLE SODIUM SCH MG: 40 TABLET, DELAYED RELEASE ORAL at 06:00

## 2021-03-30 NOTE — NUR
Patient discharged to home with all his belongings and paperwork.  All paperwork signed and 
copies given to patient.  Patient picked up by friend Marc in private vehicle.  All 
medications given as ordered. ID band removed.

## 2021-03-30 NOTE — NUR
Received patient walking in the hallway without mask, patient teaching conducted on the 
importance of masks and one given to him.  Patient can be combative.  Patient is awake alert 
and oriented times 4.  Patient has no IV access and is on room air.  He ambulates with a 
front wheel walker.  safety precautions are in place.  Will continue to monitor.

## 2021-04-04 ENCOUNTER — HOSPITAL ENCOUNTER (INPATIENT)
Dept: HOSPITAL 54 - ER | Age: 71
LOS: 5 days | Discharge: NURSING, REHAB OR LONG-TERM CARE | DRG: 500 | End: 2021-04-09
Attending: NURSE PRACTITIONER | Admitting: NURSE PRACTITIONER
Payer: MEDICARE

## 2021-04-04 VITALS — WEIGHT: 170 LBS | HEIGHT: 70 IN | BODY MASS INDEX: 24.34 KG/M2

## 2021-04-04 VITALS — DIASTOLIC BLOOD PRESSURE: 79 MMHG | SYSTOLIC BLOOD PRESSURE: 110 MMHG

## 2021-04-04 DIAGNOSIS — I10: ICD-10-CM

## 2021-04-04 DIAGNOSIS — L89.153: ICD-10-CM

## 2021-04-04 DIAGNOSIS — R29.6: ICD-10-CM
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
## 2021-04-04 DIAGNOSIS — T84.021A: Primary | ICD-10-CM

## 2021-04-04 DIAGNOSIS — Z79.84: ICD-10-CM

## 2021-04-04 DIAGNOSIS — R74.01: ICD-10-CM

## 2021-04-04 DIAGNOSIS — E88.09: ICD-10-CM
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
## 2021-04-04 DIAGNOSIS — J44.9: ICD-10-CM
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
## 2021-04-04 DIAGNOSIS — E11.9: ICD-10-CM

## 2021-04-04 DIAGNOSIS — Z20.822: ICD-10-CM

## 2021-04-04 DIAGNOSIS — Z88.0: ICD-10-CM

## 2021-04-04 DIAGNOSIS — R53.1: ICD-10-CM

## 2021-04-04 DIAGNOSIS — N28.1: ICD-10-CM
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
## 2021-04-04 DIAGNOSIS — Z96.641: ICD-10-CM

## 2021-04-04 DIAGNOSIS — E78.5: ICD-10-CM

## 2021-04-04 DIAGNOSIS — E44.0: ICD-10-CM

## 2021-04-04 DIAGNOSIS — N40.0: ICD-10-CM

## 2021-04-04 DIAGNOSIS — K80.20: ICD-10-CM

## 2021-04-04 DIAGNOSIS — F17.210: ICD-10-CM
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
## 2021-04-04 LAB
ALBUMIN SERPL BCP-MCNC: 2.7 G/DL (ref 3.4–5)
ALP SERPL-CCNC: 241 U/L (ref 46–116)
ALT SERPL W P-5'-P-CCNC: 93 U/L (ref 12–78)
AST SERPL W P-5'-P-CCNC: 74 U/L (ref 15–37)
BASOPHILS # BLD AUTO: 0.1 /CMM (ref 0–0.2)
BASOPHILS NFR BLD AUTO: 1 % (ref 0–2)
BILIRUB DIRECT SERPL-MCNC: 0.2 MG/DL (ref 0–0.2)
BILIRUB SERPL-MCNC: 0.5 MG/DL (ref 0.2–1)
BUN SERPL-MCNC: 11 MG/DL (ref 7–18)
CALCIUM SERPL-MCNC: 8.8 MG/DL (ref 8.5–10.1)
CHLORIDE SERPL-SCNC: 101 MMOL/L (ref 98–107)
CO2 SERPL-SCNC: 30 MMOL/L (ref 21–32)
CREAT SERPL-MCNC: 0.6 MG/DL (ref 0.6–1.3)
EOSINOPHIL NFR BLD AUTO: 2 % (ref 0–6)
GLUCOSE SERPL-MCNC: 190 MG/DL (ref 74–106)
HCT VFR BLD AUTO: 39 % (ref 39–51)
HGB BLD-MCNC: 13.4 G/DL (ref 13.5–17.5)
LIPASE SERPL-CCNC: 34 U/L (ref 73–393)
LYMPHOCYTES NFR BLD AUTO: 1 /CMM (ref 0.8–4.8)
LYMPHOCYTES NFR BLD AUTO: 15.2 % (ref 20–44)
MCHC RBC AUTO-ENTMCNC: 34 G/DL (ref 31–36)
MCV RBC AUTO: 95 FL (ref 80–96)
MONOCYTES NFR BLD AUTO: 0.6 /CMM (ref 0.1–1.3)
MONOCYTES NFR BLD AUTO: 9.1 % (ref 2–12)
NEUTROPHILS # BLD AUTO: 4.7 /CMM (ref 1.8–8.9)
NEUTROPHILS NFR BLD AUTO: 72.7 % (ref 43–81)
PLATELET # BLD AUTO: 126 /CMM (ref 150–450)
POTASSIUM SERPL-SCNC: 3.8 MMOL/L (ref 3.5–5.1)
PROT SERPL-MCNC: 8.7 G/DL (ref 6.4–8.2)
RBC # BLD AUTO: 4.12 MIL/UL (ref 4.5–6)
SODIUM SERPL-SCNC: 138 MMOL/L (ref 136–145)
WBC NRBC COR # BLD AUTO: 6.5 K/UL (ref 4.3–11)

## 2021-04-04 PROCEDURE — G0378 HOSPITAL OBSERVATION PER HR: HCPCS

## 2021-04-04 PROCEDURE — A6403 STERILE GAUZE>16 <= 48 SQ IN: HCPCS

## 2021-04-04 PROCEDURE — A6248 HYDROGEL DRSG GEL FILLER: HCPCS

## 2021-04-04 RX ADMIN — INSULIN HUMAN PRN UNITS: 100 INJECTION, SOLUTION PARENTERAL at 21:54

## 2021-04-04 RX ADMIN — Medication SCH MG: at 20:02

## 2021-04-04 RX ADMIN — ZOLPIDEM TARTRATE PRN MG: 5 TABLET, FILM COATED ORAL at 21:52

## 2021-04-04 RX ADMIN — TRAZODONE HYDROCHLORIDE SCH MG: 50 TABLET ORAL at 21:51

## 2021-04-04 RX ADMIN — Medication SCH EACH: at 21:52

## 2021-04-04 NOTE — NUR
BRIANA FROM HOME TO ER BED 4. AAOX4. NOT IN RESP DISTRESS, BREATHING EVEN AND 
UNLABORED. BROUGHT IN FOR L HIP PAIN S/P GLF. PT WAS FOUND ON THE FLOOR AND WAS 
REPORTED THAT HE WAS LYING ON THE FLOOR FOR THE PAST 12HRS. PT WAS CALLED IN BY 
 AS PER EMS. PEDAL PULSE APPRECIATED. AWAITING MD FOR EVAL.

## 2021-04-04 NOTE — NUR
MS RN OPENING NOTE



PATIENT A/OX4; ABLE TO MAKE NEEDS KNOWN. ON ROOM AIR, TOLERATING WELL WITH NO SOB.  
COMPLAINS OF HIP PAIN; WILL ADMINISTER PAIN MEDICATIONS PER ORDER AND WILL ENCOURAGE 
INTERVENTIONS TO REDUCE PAIN. LAC #18G; PATENT AND INTACT. SAFETY MEASURES IN PLACE: BED IN 
LOWEST LOCKED POSITION, CALL LIGHT WITHIN EASY REACH, SIDE RAILS UPX2, BED ALARMS ON. 
PATIENT IS IN STABLE CONDITION AND WILL CONTINUE PLAN OF CARE.

## 2021-04-04 NOTE — NUR
RN NOTES



PATIENT WAS ASKED TO TURN TO THE SIDE IN ORDER TO SEE THE BACK FOR SKIN ISSUES BUT PATIENT 
REFUSED, STATING THAT HIS HIP IS IN A LOT OF PAIN AND CANNOT TURN AT THIS TIME. SKIN CHECK 
AT THE BACK DEFERRED AT THIS TIME AND WILL TRY AGAIN WHEN PATIENT IS ABLE TO. WILL CONTINUE 
TO MONITOR.

## 2021-04-04 NOTE — NUR
RN NOTES



PATIENT UNABLE TO FULLY TURN TO SIDE AND UNABLE TO VISUALIZE BACK COMPLETELY; TRIED TO 
ASSIST PATIENT BUT PATIENT REFUSED TO MOVE BECAUSE OF COMPLAINT OF PAIN. WILL RETRY AGAIN 
WHEN PATIENT IS ABLE TO ASSIST BETTER IN MOBILITY.

## 2021-04-04 NOTE — NUR
RN NOTES



PHOTO OF PREVIOUS L HIP SURGICAL SITE W/ SCAB TAKEN; NO BLEEDING NOTED. SITE WAS NOTED TO BE 
DRY AND KEPT CLEAN. PATIENT IS ABLE TO TURN AND REPOSITION SELF IN BED W/ MINIMAL 
ASSISTANCE. WOUND CARE CONSULT IN PLACE. WILL CONTINUE TO MONITOR.

## 2021-04-04 NOTE — NUR
MS RN ADMISSION NOTES 



ADMITTED PATIENT TODAY FROM ER FOR LEFT HIP DISLOCATION DIAGNOSIS , BROUGHT INTO ROOM 308-1 
VIA GURNEY ACCOMPANIED BY 2 ER NURSES , PT AWAKE VERBALLY RESPONSIVE , AOX4 , ABLE TO MAKE 
NEEDS KNOWN. BREATHING EVEN AND UNLABORED ON ROOM AIR, IV LINE L AC 18 GAUGE INTASCT AND 
PATENT , PT IS ABLE TO MOVE INDEPENDANTLY IN BED BUT NEEDS ASSISTANCE WITH LEFT LOWER EXT 
MOVEMENT. C/O OF L HIP/ L EXT PAIN AND DISCOMFORT, ABLE TO TOLERATE PAIN AT THIS TIME. 
PATIENT IS CONTINENT , NO SKIN ISSUES PRESENT. NP LIANET MEZA , ADMITTING NP AWARE OF 
ADMISSION , PT WAS ORIENTED TO ROOM , CALL LIGHT FOR ASSISTANCE, SAFETY PRECAUTIONS IN PLACE 
, WILL CONT TO MONITOR.

## 2021-04-04 NOTE — NUR
RN NOTES



RECEIVED CALL FROM HERI MEZA NP, AND MADE AWARE OF MED RECON; PER NP, PATIENT IS OKAY TO 
EAT FOR DINNER TODAY BUT WILL BE NPO POST MIDNIGHT. WILL ALSO DO MED RECON FOR PATIENT. 
PATIENT CURRENTLY EATING DINNER W/ NO ISSUES NOTED. WILL CONTINUE TO MONITOR.

## 2021-04-05 VITALS — DIASTOLIC BLOOD PRESSURE: 63 MMHG | SYSTOLIC BLOOD PRESSURE: 117 MMHG

## 2021-04-05 VITALS — SYSTOLIC BLOOD PRESSURE: 117 MMHG | DIASTOLIC BLOOD PRESSURE: 73 MMHG

## 2021-04-05 VITALS — SYSTOLIC BLOOD PRESSURE: 107 MMHG | DIASTOLIC BLOOD PRESSURE: 64 MMHG

## 2021-04-05 LAB
ALBUMIN SERPL BCP-MCNC: 2.5 G/DL (ref 3.4–5)
ALP SERPL-CCNC: 220 U/L (ref 46–116)
ALT SERPL W P-5'-P-CCNC: 87 U/L (ref 12–78)
AST SERPL W P-5'-P-CCNC: 57 U/L (ref 15–37)
BASOPHILS # BLD AUTO: 0 /CMM (ref 0–0.2)
BASOPHILS NFR BLD AUTO: 0.6 % (ref 0–2)
BILIRUB SERPL-MCNC: 0.5 MG/DL (ref 0.2–1)
BUN SERPL-MCNC: 11 MG/DL (ref 7–18)
CALCIUM SERPL-MCNC: 8.7 MG/DL (ref 8.5–10.1)
CHLORIDE SERPL-SCNC: 104 MMOL/L (ref 98–107)
CK SERPL-CCNC: 159 U/L (ref 39–308)
CO2 SERPL-SCNC: 32 MMOL/L (ref 21–32)
CREAT SERPL-MCNC: 0.6 MG/DL (ref 0.6–1.3)
EOSINOPHIL NFR BLD AUTO: 7.8 % (ref 0–6)
GLUCOSE SERPL-MCNC: 141 MG/DL (ref 74–106)
HCT VFR BLD AUTO: 38 % (ref 39–51)
HGB BLD-MCNC: 12.8 G/DL (ref 13.5–17.5)
LYMPHOCYTES NFR BLD AUTO: 1.6 /CMM (ref 0.8–4.8)
LYMPHOCYTES NFR BLD AUTO: 27.6 % (ref 20–44)
MAGNESIUM SERPL-MCNC: 1.9 MG/DL (ref 1.8–2.4)
MCHC RBC AUTO-ENTMCNC: 34 G/DL (ref 31–36)
MCV RBC AUTO: 95 FL (ref 80–96)
MONOCYTES NFR BLD AUTO: 0.5 /CMM (ref 0.1–1.3)
MONOCYTES NFR BLD AUTO: 9.5 % (ref 2–12)
NEUTROPHILS # BLD AUTO: 3.1 /CMM (ref 1.8–8.9)
NEUTROPHILS NFR BLD AUTO: 54.5 % (ref 43–81)
PHOSPHATE SERPL-MCNC: 3.9 MG/DL (ref 2.5–4.9)
PLATELET # BLD AUTO: 107 /CMM (ref 150–450)
POTASSIUM SERPL-SCNC: 4.7 MMOL/L (ref 3.5–5.1)
PROT SERPL-MCNC: 8.2 G/DL (ref 6.4–8.2)
RBC # BLD AUTO: 3.98 MIL/UL (ref 4.5–6)
SODIUM SERPL-SCNC: 141 MMOL/L (ref 136–145)
WBC NRBC COR # BLD AUTO: 5.7 K/UL (ref 4.3–11)

## 2021-04-05 RX ADMIN — Medication PRN TAB: at 16:02

## 2021-04-05 RX ADMIN — PANTOPRAZOLE SODIUM SCH MG: 40 TABLET, DELAYED RELEASE ORAL at 07:30

## 2021-04-05 RX ADMIN — Medication SCH EACH: at 12:01

## 2021-04-05 RX ADMIN — Medication SCH EACH: at 16:58

## 2021-04-05 RX ADMIN — HYDROMORPHONE HYDROCHLORIDE PRN MG: 1 INJECTION, SOLUTION INTRAMUSCULAR; INTRAVENOUS; SUBCUTANEOUS at 08:10

## 2021-04-05 RX ADMIN — Medication SCH MG: at 12:56

## 2021-04-05 RX ADMIN — Medication SCH EACH: at 06:15

## 2021-04-05 RX ADMIN — Medication SCH EACH: at 22:08

## 2021-04-05 RX ADMIN — TRAZODONE HYDROCHLORIDE SCH MG: 50 TABLET ORAL at 21:19

## 2021-04-05 RX ADMIN — INSULIN HUMAN PRN UNITS: 100 INJECTION, SOLUTION PARENTERAL at 22:17

## 2021-04-05 RX ADMIN — Medication SCH GM: at 12:01

## 2021-04-05 RX ADMIN — SODIUM CHLORIDE PRN MLS/HR: 9 INJECTION, SOLUTION INTRAVENOUS at 01:18

## 2021-04-05 RX ADMIN — ALPRAZOLAM PRN MG: 1 TABLET ORAL at 21:26

## 2021-04-05 RX ADMIN — INSULIN HUMAN PRN UNITS: 100 INJECTION, SOLUTION PARENTERAL at 11:44

## 2021-04-05 RX ADMIN — INSULIN HUMAN PRN UNITS: 100 INJECTION, SOLUTION PARENTERAL at 06:17

## 2021-04-05 RX ADMIN — Medication SCH MG: at 21:18

## 2021-04-05 RX ADMIN — Medication SCH MG: at 04:27

## 2021-04-05 RX ADMIN — MONTELUKAST SODIUM SCH MG: 10 TABLET, FILM COATED ORAL at 08:02

## 2021-04-05 RX ADMIN — HYDROMORPHONE HYDROCHLORIDE PRN MG: 1 INJECTION, SOLUTION INTRAMUSCULAR; INTRAVENOUS; SUBCUTANEOUS at 20:33

## 2021-04-05 RX ADMIN — Medication PRN TAB: at 11:35

## 2021-04-05 RX ADMIN — ZOLPIDEM TARTRATE PRN MG: 5 TABLET, FILM COATED ORAL at 21:26

## 2021-04-05 RX ADMIN — INSULIN HUMAN PRN UNITS: 100 INJECTION, SOLUTION PARENTERAL at 16:56

## 2021-04-05 RX ADMIN — HYDROMORPHONE HYDROCHLORIDE PRN MG: 1 INJECTION, SOLUTION INTRAMUSCULAR; INTRAVENOUS; SUBCUTANEOUS at 01:21

## 2021-04-05 NOTE — NUR
MS RN OPENING NOTE



PATIENT A/OX4; ABLE TO MAKE NEEDS KNOWN. ON ROOM AIR, TOLERATING WELL WITH NO SOB.  NO S/SX 
OF DISTRESS. PATIENT IS NWB DUE TO HIP FRACTURE AND NPO AT THIS TIME SINCE 12AM PER REPORT. 
BREATHING EVENLY AND UNLABORED.  SKIN INTACT WITH L HIP SURGICAL SITE. LAC #18G; PATENT AND 
INTACT, FLUIDS ARE INFUSING.  SAFETY MEASURES IN PLACE: BED IN LOWEST LOCKED POSITION, CALL 
LIGHT WITHIN EASY REACH, SIDE RAILS UPX2, BED ALARMS ON CALL LIGHT WITHIN REACH.  PATIENT IS 
IN STABLE CONDITION, WILL CONTINUE TO MONITOR THROUGHOUT SHIFT.

## 2021-04-05 NOTE — NUR
MS RN NOTES - PAIN



PATIENT C/O 10/10 L HIP PAIN. ADMINISTERED DILAUDID AS ORDERED. WILL CONTINUE TO ASSESS FOR 
PAIN

## 2021-04-05 NOTE — NUR
MS RN CLOSING NOTE



PATIENT A/OX4; ABLE TO MAKE NEEDS KNOWN. ON ROOM AIR, TOLERATING WELL WITH NO SOB.  NO S/SX 
OF DISTRESS. PATIENT IS NWB DUE TO HIP FRACTURE. PATIENT IS ON CCHO DIET DUE TO NO SURGERY 
CURRENTLY SCHEDULED, AWAITING SURGERY TIME. PATIENT ASKED TO BE OFF IV DUE TO DRINKING 
FLUIDS, ONCE NPO PATIENT HAS ORDER FOR NS @ 75 ML/HR.  BREATHING EVENLY AND UNLABORED.  LAC 
#18G; PATENT AND INTACT. SKIN NOTED WITH PRESSURE ULCER ON SACRUM AND  L HIP SURGICAL SITE.  
 SAFETY MEASURES IN PLACE: BED IN LOWEST LOCKED POSITION, CALL LIGHT WITHIN EASY REACH, SIDE 
RAILS UPX2, BED ALARMS ON CALL LIGHT WITHIN REACH.  PATIENT COMPLAINED OF PAIN THROUGHOUT 
SHIFT, PAIN MEDICATION WAS GIVEN AS ORDERED. PATIENT IS IN STABLE CONDITION.

## 2021-04-05 NOTE — NUR
WOUND CARE CONSULT: PT PRESENTS WITH CLOSED SURGICAL SITE TO LEFT  HIP AND STAGE 3 PRESSURE 
ULCER TO PRESACRAL AREA WITH PERIWOUND MACERATION, PRESENT ON ADMISSION. RECOMMENDATIONS 
MADE FOR SKIN PROTECTION AND WOUND CARE. DISCUSSED WITH NURSING STAFF. DR FRANCIS MOREL CALLED 
FOR SURGICAL CONSULT OF PRESACRAL WOUND. PT TO BE PLACED ON LISETTE ISOFLEX LOW AIRLOSS BED. 
MD IN AGREEMENT WITH PLAN OF CARE. 

-------------------------------------------------------------------------------

Addendum: 04/05/21 at 1025 by ISABELL VÁZQUEZ WNDNU

-------------------------------------------------------------------------------

Amended: Links added.

## 2021-04-05 NOTE — NUR
MS RN CLOSING NOTE



PATIENT A/OX4; ABLE TO MAKE NEEDS KNOWN. ON ROOM AIR, TOLERATING WELL WITH NO SOB.  
COMPLAINS OF L HIP PAIN; WILL ENDORSE MORNING RN TO ADMINISTER PAIN MEDICATIONS PER ORDER. 
LAC #18G; INFUSING NS @ 75ML/HR; PATENT AND INTACT. MAINTAINED NPO DIET AFTER MIDNIGHT. 
SAFETY MEASURES IN PLACE: BED IN LOWEST LOCKED POSITION, CALL LIGHT WITHIN EASY REACH, SIDE 
RAILS UPX2, BED ALARMS ON. PATIENT IS IN STABLE CONDITION AND WILL ENDORSE PLAN OF CARE TO 
ONCOMING MORNING RN.

## 2021-04-05 NOTE — NUR
RN NOTES



INFORMED BY CHARGE NURSE THAT NO CURRENT PROCEDURE SCHEDULED AT THIS TIME; OK TO GIVE FOOD 
TO PATIENT FOR NOW. [facility]

## 2021-04-05 NOTE — NUR
PATIENT COMPLAINED OF PAIN 8/10 RADIATING PAIN FROM HIP TO LOWER LEG. GAVE DILAUDID PRN  AS 
ORDERED. WILL REASSESS PAIN

## 2021-04-05 NOTE — NUR
RN NOTES



PATIENT PLACED ON LISETTE ISOFLEX LOW AIR-LOSS BED FOR SKIN INTEGRITY AND WOUND MGT. PATIENT 
ABLE TO ASSIST W/ BED MOBILITY WHEN IN BED.

## 2021-04-05 NOTE — NUR
RN NOTES



WOUND CARE NURSE CAME TO EVALUATE THE PATIENT. PATIENT ALLOWED NURSES TO CHECK HIS POSTERIOR 
AREA. PATIENT WAS NOTED WITH WOUND [id number]. WITH WOUND TREATMENT RECOMMENDATIONS. WILL 
CONTINUE TO MONITOR.

## 2021-04-05 NOTE — NUR
MS RN OPENING NOTES



PT WAS SEEN AWAKE LYING IN BED. PT IS ALERT AND ORIENTED X 4. PT'S STABLE ON ROOM AIR, 
BREATHING EVEN AND UNLABORED. PT HAD 10/10 ACHING AND THROBBING PAIN IN LEFT HIP. PT WAS 
GIVEN DILAUDID 1 MG/1ML IV AT 2033. WILL CONTINUE TO MONITOR PAIN. PT HAS A PRESSURE ULCER 
ON SACRUM AND A SURGICAL SITE ON LEFT HIP. PT HAS AN IV ACCESS ON LAC WHICH IS INTACT AND 
PATENT. SAFETY MEASURES IN PLACE. CALL LIGHT WITHIN EASY REACH AND BED ALARM ON. WILL 
CONTINUE TO MONITOR THE PT.

## 2021-04-05 NOTE — NUR
RN NOTES



PATIENT ALLOWED NURSES TO CHECK HIS BACK AREA TO INSPECT FOR ANY SKIN ISSUES. PHOTO OF SKIN 
ISSUE TAKEN AND PLACED IN CHART.

## 2021-04-05 NOTE — NUR
RN NOTES

PATIENT COMPLAINED OF PAIN 6/10 RADIATING PAIN FROM HIP TO LOWER LEG. GAVE NORCO PRN  AS 
ORDERED. WILL REASSESS PAIN

## 2021-04-05 NOTE — NUR
RN NOTES



PATIENT WAS SEEN BY LUCY FAROOQ OF DR. MOREL, AND EXPLAINED PLAN OF CARE FOR DEBRIDEMENT OF 
SACRAL STAGE 3 PRESSURE INJURY. PATIENT REFUSED DEBRIDEMENT AT THIS TIME, STATES THAT IT'S 
PAINFUL FOR HIM TO TURN TO HIS SIDE; EXPLAINED IMPORTANCE OF WOUND DEBRIDEMENT BUT PATIENT 
STILL REFUSED. WILL CONTINUE WITH WOUND TREATMENT AS ORDERED AND MONITOR ACCORDINGLY.

## 2021-04-06 VITALS — SYSTOLIC BLOOD PRESSURE: 96 MMHG | DIASTOLIC BLOOD PRESSURE: 60 MMHG

## 2021-04-06 VITALS — SYSTOLIC BLOOD PRESSURE: 112 MMHG | DIASTOLIC BLOOD PRESSURE: 70 MMHG

## 2021-04-06 VITALS — DIASTOLIC BLOOD PRESSURE: 74 MMHG | SYSTOLIC BLOOD PRESSURE: 124 MMHG

## 2021-04-06 VITALS — DIASTOLIC BLOOD PRESSURE: 70 MMHG | SYSTOLIC BLOOD PRESSURE: 112 MMHG

## 2021-04-06 LAB
ALBUMIN SERPL BCP-MCNC: 2.3 G/DL (ref 3.4–5)
ALP SERPL-CCNC: 195 U/L (ref 46–116)
ALT SERPL W P-5'-P-CCNC: 66 U/L (ref 12–78)
AST SERPL W P-5'-P-CCNC: 52 U/L (ref 15–37)
BASOPHILS # BLD AUTO: 0 /CMM (ref 0–0.2)
BASOPHILS NFR BLD AUTO: 0.7 % (ref 0–2)
BILIRUB SERPL-MCNC: 0.4 MG/DL (ref 0.2–1)
BILIRUB UR QL STRIP: NEGATIVE
BUN SERPL-MCNC: 12 MG/DL (ref 7–18)
CALCIUM SERPL-MCNC: 8.2 MG/DL (ref 8.5–10.1)
CHLORIDE SERPL-SCNC: 102 MMOL/L (ref 98–107)
CO2 SERPL-SCNC: 33 MMOL/L (ref 21–32)
COLOR UR: YELLOW
CREAT SERPL-MCNC: 0.7 MG/DL (ref 0.6–1.3)
EOSINOPHIL NFR BLD AUTO: 6.7 % (ref 0–6)
GLUCOSE SERPL-MCNC: 213 MG/DL (ref 74–106)
GLUCOSE UR STRIP-MCNC: NEGATIVE MG/DL
HCT VFR BLD AUTO: 35 % (ref 39–51)
HGB BLD-MCNC: 11.8 G/DL (ref 13.5–17.5)
LEUKOCYTE ESTERASE UR QL STRIP: NEGATIVE
LYMPHOCYTES NFR BLD AUTO: 1.1 /CMM (ref 0.8–4.8)
LYMPHOCYTES NFR BLD AUTO: 23.2 % (ref 20–44)
MCHC RBC AUTO-ENTMCNC: 34 G/DL (ref 31–36)
MCV RBC AUTO: 95 FL (ref 80–96)
MONOCYTES NFR BLD AUTO: 0.4 /CMM (ref 0.1–1.3)
MONOCYTES NFR BLD AUTO: 9 % (ref 2–12)
NEUTROPHILS # BLD AUTO: 3 /CMM (ref 1.8–8.9)
NEUTROPHILS NFR BLD AUTO: 60.4 % (ref 43–81)
NITRITE UR QL STRIP: NEGATIVE
PH UR STRIP: 6 [PH] (ref 5–8)
PLATELET # BLD AUTO: 101 /CMM (ref 150–450)
POTASSIUM SERPL-SCNC: 4 MMOL/L (ref 3.5–5.1)
PROT SERPL-MCNC: 7.5 G/DL (ref 6.4–8.2)
PROT UR QL STRIP: NEGATIVE MG/DL
RBC # BLD AUTO: 3.67 MIL/UL (ref 4.5–6)
SODIUM SERPL-SCNC: 139 MMOL/L (ref 136–145)
UROBILINOGEN UR STRIP-MCNC: 0.2 EU/DL
WBC NRBC COR # BLD AUTO: 4.9 K/UL (ref 4.3–11)

## 2021-04-06 PROCEDURE — 0KBP0ZZ EXCISION OF LEFT HIP MUSCLE, OPEN APPROACH: ICD-10-PCS | Performed by: NURSE PRACTITIONER

## 2021-04-06 RX ADMIN — HYDROMORPHONE HYDROCHLORIDE PRN MG: 1 INJECTION, SOLUTION INTRAMUSCULAR; INTRAVENOUS; SUBCUTANEOUS at 17:18

## 2021-04-06 RX ADMIN — Medication SCH EACH: at 17:41

## 2021-04-06 RX ADMIN — Medication SCH EACH: at 12:34

## 2021-04-06 RX ADMIN — ENOXAPARIN SODIUM SCH MG: 40 INJECTION SUBCUTANEOUS at 09:00

## 2021-04-06 RX ADMIN — Medication SCH MG: at 21:28

## 2021-04-06 RX ADMIN — ZOLPIDEM TARTRATE PRN MG: 5 TABLET, FILM COATED ORAL at 22:26

## 2021-04-06 RX ADMIN — PANTOPRAZOLE SODIUM SCH MG: 40 TABLET, DELAYED RELEASE ORAL at 07:30

## 2021-04-06 RX ADMIN — INSULIN HUMAN PRN UNITS: 100 INJECTION, SOLUTION PARENTERAL at 12:44

## 2021-04-06 RX ADMIN — ALPRAZOLAM PRN MG: 1 TABLET ORAL at 22:25

## 2021-04-06 RX ADMIN — INSULIN HUMAN PRN UNITS: 100 INJECTION, SOLUTION PARENTERAL at 22:18

## 2021-04-06 RX ADMIN — Medication SCH APPLIC: at 12:48

## 2021-04-06 RX ADMIN — TRAZODONE HYDROCHLORIDE SCH MG: 50 TABLET ORAL at 22:25

## 2021-04-06 RX ADMIN — HYDROMORPHONE HYDROCHLORIDE PRN MG: 1 INJECTION, SOLUTION INTRAMUSCULAR; INTRAVENOUS; SUBCUTANEOUS at 11:01

## 2021-04-06 RX ADMIN — MONTELUKAST SODIUM SCH MG: 10 TABLET, FILM COATED ORAL at 09:00

## 2021-04-06 RX ADMIN — Medication SCH MG: at 06:09

## 2021-04-06 RX ADMIN — Medication SCH EACH: at 22:19

## 2021-04-06 RX ADMIN — Medication SCH MG: at 13:04

## 2021-04-06 RX ADMIN — Medication SCH EACH: at 07:30

## 2021-04-06 RX ADMIN — INSULIN HUMAN PRN UNITS: 100 INJECTION, SOLUTION PARENTERAL at 17:43

## 2021-04-06 NOTE — NUR
MS CLOSING NOTE

RESIDENT IN BED, ALERT, AWAKE, ORIENTED X4, ABLE TO MAKE NEEDS KNOWN, VERBALLY RESPONSIVE. 
BREATHING EVEN AND NON-LABORED. NO COMPLAINT OF PAIN OR DISCOMFORT AT THIS TIME. ALL DUE 
MEDS ARE GIVEN AS ORDERED, NO ADR NOTED, TOLERATED WELL. IV SITE ON LEFT AC, PATENT, INTACT, 
NO S/S OF INFILTRATION. KEPT CLEAN AND DRY. ALL NEEDS MET TO PROMPTLY. CALL LIGHT WITHIN 
EASY REACH.

## 2021-04-06 NOTE — NUR
RM MS NOTES



PT WAS UNCOOPERATIVE AND REFUSED TO HAVE HIS FINGER STICK BLOOD SUGAR CHECKED AT 0630. PT IS 
SLEEPING AT THIS TIME. WILL CONTINUE TO MONITOR THE PT.

## 2021-04-06 NOTE — NUR
MS RN

RECEIVED RESIDENT IN BED, ALERT, AWAKE, ORIENTED X4, ABLE TO MAKE NEEDS KNOWN, VERBALLY 
RESPONSIVE. BREATHING EVEN AND NON-LABORED. NO COMPLAINT OF PAIN/DISCOMFORT AT THIS TIME. IV 
ON RIGHT WRIST NOTED, PATENT, INTACT, NO S/S OF INFILTRATION. ALL NEEDS ATTENDED PROMPTLY.  
CALL LIGHT WITHIN EASY REACH.

## 2021-04-06 NOTE — NUR
MS RN

EXCISIONAL DEBRIDEMENT OF SACRAL PRESSURE ULCER DONE BY KARTHIKEYAN ADAM, NO S/S OF 
INFECTION, MINIMAL BLEEDING NOTED, NO FOUL ODOR, COVERED WITH DR DRESSING. WILL CONTINUE TO 
MONITOR.

## 2021-04-06 NOTE — NUR
MS OPENING NOTES

RESIDENT IN BED, ASLEEP BUT EASILY AROUSABLE. NO S/S OF PAIN OR DISCOMFORT AT THIS TIME. 
BREATHING EVEN AND NON-LABORED. IV NOTED ON LEFT AC, NO S/S OF INFILTRATION. CALL LIGHT 
WITHIN EASY REACH.

## 2021-04-06 NOTE — NUR
MS RN CLOSING NOTES



PT WAS SEEN SLEEPING IN BED. PT IS ALERT AND ORIENTED X 4. PT'S STABLE ON ROOM AIR, 
BREATHING EVEN AND UNLABORED. PT HAS A PRESSURE ULCER ON SACRUM AND A SURGICAL SITE ON LEFT 
HIP. PT HAS AN IV ACCESS ON LAC WHICH IS INTACT AND PATENT. SAFETY MEASURES IN PLACE. CALL 
LIGHT WITHIN EASY REACH AND BED ALARM ON. WILL ENDORSE CONTINUITY OF CARE TO DAY SHIFT 
NURSE.

## 2021-04-06 NOTE — NUR
RECEIVED IN BED ALERT AND ORIENTAED X4 IV NOT RUNNING SITE RED RESTARTED WITH 20 G LEFT 
UPPER ARM, PT ADAMENT WERE TO PLACE IV HE IS LOUD AND ONLY WANTS REQUISTE DONE HIS WAY OR 
NURSING NEEDS. HE IS ARGUMENTIVE.

## 2021-04-07 VITALS — SYSTOLIC BLOOD PRESSURE: 140 MMHG | DIASTOLIC BLOOD PRESSURE: 74 MMHG

## 2021-04-07 VITALS — DIASTOLIC BLOOD PRESSURE: 80 MMHG | SYSTOLIC BLOOD PRESSURE: 130 MMHG

## 2021-04-07 VITALS — SYSTOLIC BLOOD PRESSURE: 140 MMHG | DIASTOLIC BLOOD PRESSURE: 72 MMHG

## 2021-04-07 VITALS — SYSTOLIC BLOOD PRESSURE: 126 MMHG | DIASTOLIC BLOOD PRESSURE: 72 MMHG

## 2021-04-07 VITALS — DIASTOLIC BLOOD PRESSURE: 78 MMHG | SYSTOLIC BLOOD PRESSURE: 141 MMHG

## 2021-04-07 VITALS — SYSTOLIC BLOOD PRESSURE: 145 MMHG | DIASTOLIC BLOOD PRESSURE: 82 MMHG

## 2021-04-07 VITALS — DIASTOLIC BLOOD PRESSURE: 69 MMHG | SYSTOLIC BLOOD PRESSURE: 139 MMHG

## 2021-04-07 VITALS — DIASTOLIC BLOOD PRESSURE: 73 MMHG | SYSTOLIC BLOOD PRESSURE: 122 MMHG

## 2021-04-07 LAB
BASOPHILS # BLD AUTO: 0 /CMM (ref 0–0.2)
BASOPHILS NFR BLD AUTO: 0.6 % (ref 0–2)
BUN SERPL-MCNC: 14 MG/DL (ref 7–18)
CALCIUM SERPL-MCNC: 8.5 MG/DL (ref 8.5–10.1)
CHLORIDE SERPL-SCNC: 103 MMOL/L (ref 98–107)
CO2 SERPL-SCNC: 31 MMOL/L (ref 21–32)
CREAT SERPL-MCNC: 0.7 MG/DL (ref 0.6–1.3)
EOSINOPHIL NFR BLD AUTO: 7.8 % (ref 0–6)
GLUCOSE SERPL-MCNC: 138 MG/DL (ref 74–106)
HCT VFR BLD AUTO: 35 % (ref 39–51)
HGB BLD-MCNC: 11.8 G/DL (ref 13.5–17.5)
LYMPHOCYTES NFR BLD AUTO: 1.5 /CMM (ref 0.8–4.8)
LYMPHOCYTES NFR BLD AUTO: 27.3 % (ref 20–44)
MCHC RBC AUTO-ENTMCNC: 34 G/DL (ref 31–36)
MCV RBC AUTO: 94 FL (ref 80–96)
MONOCYTES NFR BLD AUTO: 0.5 /CMM (ref 0.1–1.3)
MONOCYTES NFR BLD AUTO: 9.8 % (ref 2–12)
NEUTROPHILS # BLD AUTO: 2.9 /CMM (ref 1.8–8.9)
NEUTROPHILS NFR BLD AUTO: 54.5 % (ref 43–81)
PLATELET # BLD AUTO: 101 /CMM (ref 150–450)
POTASSIUM SERPL-SCNC: 4.2 MMOL/L (ref 3.5–5.1)
RBC # BLD AUTO: 3.68 MIL/UL (ref 4.5–6)
SODIUM SERPL-SCNC: 139 MMOL/L (ref 136–145)
WBC NRBC COR # BLD AUTO: 5.4 K/UL (ref 4.3–11)

## 2021-04-07 PROCEDURE — 0LNK3ZZ RELEASE LEFT HIP TENDON, PERCUTANEOUS APPROACH: ICD-10-PCS

## 2021-04-07 PROCEDURE — 0KBN0ZZ EXCISION OF RIGHT HIP MUSCLE, OPEN APPROACH: ICD-10-PCS

## 2021-04-07 PROCEDURE — 0SWBXJZ REVISION OF SYNTHETIC SUBSTITUTE IN LEFT HIP JOINT, EXTERNAL APPROACH: ICD-10-PCS

## 2021-04-07 RX ADMIN — ENOXAPARIN SODIUM SCH MG: 40 INJECTION SUBCUTANEOUS at 09:00

## 2021-04-07 RX ADMIN — PANTOPRAZOLE SODIUM SCH MG: 40 TABLET, DELAYED RELEASE ORAL at 07:30

## 2021-04-07 RX ADMIN — HYDROCODONE BITARTRATE AND ACETAMINOPHEN PRN TAB: 10; 325 TABLET ORAL at 21:05

## 2021-04-07 RX ADMIN — INSULIN HUMAN PRN UNITS: 100 INJECTION, SOLUTION PARENTERAL at 17:36

## 2021-04-07 RX ADMIN — ZOLPIDEM TARTRATE PRN MG: 5 TABLET, FILM COATED ORAL at 21:05

## 2021-04-07 RX ADMIN — SODIUM CHLORIDE PRN MLS/HR: 9 INJECTION, SOLUTION INTRAVENOUS at 00:52

## 2021-04-07 RX ADMIN — INSULIN HUMAN PRN UNITS: 100 INJECTION, SOLUTION PARENTERAL at 06:34

## 2021-04-07 RX ADMIN — MONTELUKAST SODIUM SCH MG: 10 TABLET, FILM COATED ORAL at 09:00

## 2021-04-07 RX ADMIN — Medication SCH EACH: at 21:12

## 2021-04-07 RX ADMIN — Medication SCH EACH: at 05:37

## 2021-04-07 RX ADMIN — Medication SCH APPLIC: at 09:36

## 2021-04-07 RX ADMIN — ALPRAZOLAM PRN MG: 1 TABLET ORAL at 21:05

## 2021-04-07 RX ADMIN — Medication SCH EACH: at 12:06

## 2021-04-07 RX ADMIN — Medication PRN MG: at 17:26

## 2021-04-07 RX ADMIN — Medication SCH EACH: at 17:30

## 2021-04-07 RX ADMIN — TRAZODONE HYDROCHLORIDE SCH MG: 50 TABLET ORAL at 21:04

## 2021-04-07 RX ADMIN — Medication SCH MG: at 05:00

## 2021-04-07 NOTE — NUR
MS RN

PATIENT PICKED UP BY SURGERY TEAM VIA BED FOR LEFT HIP CLOSED REDUCTION UNDER ANESTHESIA. 
ALERT, AWAKE, ORIENTED X4, ABLE TO MAKE NEEDS KNOWN, VERBALLY RESPONSIVE. NO COMPLAINT OF 
PAIN OR DISCOMFORT AT THIS TIME. BREATHING EVEN AND NON-LABORED. LEFT UPPER ARM IV SITE 
NOTED, INTACT, PATENT, NO S/S OF INFILTRATION. VITAL SIGN /80 HR 75 RR 18 TEMPERATURE 
97.4 DEGREES FAHRENHEIT 97% ROOM AIR. ALL NEEDS AND CARE ATTENDED WELL.

## 2021-04-07 NOTE — NUR
MS RN 

RECEIVED A CALL FROM JESSICA FROM RECOVERY UNIT REGARDING PATIENT DONE WITH THE SURGERY AND 
READY TO COME BACK TO THE UNIT WITH NEW ORDERS.

## 2021-04-07 NOTE — NUR
MS RN OPENING NOTES

PATIENT IN BED, ASLEEP BUT EASILY AROUSABLE. NO MANIFESTATION OF PAIN OR DISCOMFORT AT THIS 
TIME. LEFT UPPER ARM IV NOTED, CONNECTED TO NS AT 75ML/HR, INFUSING WELL, INTACT, PATENT, NO 
S/S OF INFILTRATION. KEPT CLEAN AND DRY. ALL NEEDS AND CARE ATTENDED. CALL LIGHT WITHIN 
EASILY REACH.

## 2021-04-07 NOTE — NUR
ms DARINEL notes

 talked to the patient that i can give his pain medication and his Xanax but his Ambien and 
Trazadone i will give it by 22:00. Pt started screaming  at me and insisted that he wants 
its all together even I explained to him that possible side effect and saying I took 
everything in once and I'll be fine.  Medication administered and Charge nurse aware. pt 
also refused to have his blood sugar checked and saying do it aaron. kept him warm and 
comfortable at all times. will continue monitoring. call light at reach.

## 2021-04-07 NOTE — NUR
0500 AM OXYCODONE NOT GIVEN D/T TO SEDATED

 WILL WAKE UP BUT NEEDS TO BE CALLED AND ARM SHANKEN TO AROUSE HIM. AWAKENS ALERT BUT FALLS 
ASLEEP AGAIN.  02 ON 2 LITERS AT 96% W/O 02 WHEN ASLEEP SATS 80%.

## 2021-04-07 NOTE — NUR
MS RN

PATIENT CAME BACK FROM CLOSED REDUCTION SURGERY ACCOMPANIED BY 2 NURSES VIA BED. AWAKE, 
ALERT, ORIENTED X4. VERBALLY RESPONSIVE, ABLE TO MAKE NEEDS KNOWN. BREATHING EVEN AND 
NON-LABORED. DENIES PAIN OR DISCOMFORT AT THIS TIME. LEFT UPPER CHEST IV NOTED, PATENT, 
INTACT. ABDOMEN SOFT, NON DISTENDED. NO BLEEDING ON THE SURGERY SITE, NO S/S OF INFECTION, 
NO DRAINAGE. ABDUCTOR PILLOW NOTED BETWEEN THE LEGS. ALL NEEDS AND CARE ATTENDED PROMPTLY. 
CALL LIGHT WITHIN EASY REACH.

## 2021-04-07 NOTE — NUR
MS CLOSING NOTES RN

PATIENT IN BED, AWAKE, ALERT, ORIENTED X4. ABLE TO MAKE NEEDS KNOWN, VERBALLY RESPONSIVE. NO 
COMPLAINT OF PAIN OR DISCOMFORT AT THIS TIME. NO S/S OF RESPIRATORY DISTRESS. ALL DUE MEDS 
ARE GIVEN AS ORDERED, NO ADR NOTED, TOLERATED WELL. KEPT CLEAN AND DRY. ABDUCTOR PILLOW IN 
PLACE AT ALL TIMES. ALL NEEDS AND CARE ATTENDED PROMPTLY. CALL LIGHT WITHIN EASY REACH.

## 2021-04-07 NOTE — NUR
ms lvn initial notes

 received report from am nurse and seen pt in bed awake and alert watching TV at this time, 
not in any acute distress noted. Pt refused to have IVF to be infused instead he's saying 
just started aaron. Pt also asking for his pain medication and other meds together. I told him 
i will check whats the time of other med and I will let him know.  Dressing dry and intact. 
kept him warm and comfortable at all times. will continue monitoring. place call light at 
reach.

## 2021-04-07 NOTE — NUR
ENDING NOTES: MEDICATED WITH  HIS NIGHT TIME MEDS.  SLEPT WELL THRU THE NIGHT.  REASP EVEN 
AND UNLABORED 16 TO 18 BPM SKIN WRM AND DRY SAT TAKEN WHILE PT ASLEEP NOT HIS SATSON ROOM 
AIR AND ASLEEP WAS 80% PT SUPINE POSITION.  PLACED ON 2 LITERS N/C PATENT WOKE UP AND 
ALLOWED ME TO PLACE THE O2 N/C. USING THE URINAL.  KEPT NPO AFTER MIDNIGHT D/T POSSIBLE 
SCHEDULING FOR THE FIXING OF THE LEFT HIP BY MD SINHA.  

IV CONT TO INFUSE THRU THE NIGHT SITE LEFT UPPER ARM.  HE IS ADAMET ABOUT NOT BEING 
REPOSITIONED AND LYING ON THE RIGHT , MADE HIM AWARE OF THE SKIN ULCER ON HIS BACK SIDE, HE 
STATED " I KNOW".  RIGHT / LEFT PPP AND RANDAL FEET WARM HE IS ABLE TO MOVE ABOUT IN BED 
INDEPENTENDLY

## 2021-04-08 VITALS — SYSTOLIC BLOOD PRESSURE: 136 MMHG | DIASTOLIC BLOOD PRESSURE: 76 MMHG

## 2021-04-08 VITALS — SYSTOLIC BLOOD PRESSURE: 129 MMHG | DIASTOLIC BLOOD PRESSURE: 80 MMHG

## 2021-04-08 VITALS — DIASTOLIC BLOOD PRESSURE: 95 MMHG | SYSTOLIC BLOOD PRESSURE: 101 MMHG

## 2021-04-08 LAB
ALBUMIN SERPL ELPH-MCNC: 2.5 G/DL (ref 2.9–4.4)
ALBUMIN/GLOB SERPL: 0.5 {RATIO} (ref 0.7–1.7)
ALPHA1 GLOB SERPL ELPH-MCNC: 0.3 G/DL (ref 0–0.4)
ALPHA2 GLOB SERPL ELPH-MCNC: 0.8 G/DL (ref 0.4–1)
B-GLOBULIN SERPL ELPH-MCNC: 1.2 G/DL (ref 0.7–1.3)
BASOPHILS # BLD AUTO: 0.1 /CMM (ref 0–0.2)
BASOPHILS NFR BLD AUTO: 0.9 % (ref 0–2)
BUN SERPL-MCNC: 13 MG/DL (ref 7–18)
CALCIUM SERPL-MCNC: 8.2 MG/DL (ref 8.5–10.1)
CHLORIDE SERPL-SCNC: 102 MMOL/L (ref 98–107)
CO2 SERPL-SCNC: 29 MMOL/L (ref 21–32)
CREAT SERPL-MCNC: 0.7 MG/DL (ref 0.6–1.3)
EOSINOPHIL NFR BLD AUTO: 3.9 % (ref 0–6)
GAMMA GLOB SERPL ELPH-MCNC: 2.4 G/DL (ref 0.4–1.8)
GLOBULIN SER CALC-MCNC: 4.7 G/DL (ref 2.2–3.9)
GLUCOSE SERPL-MCNC: 270 MG/DL (ref 74–106)
HCT VFR BLD AUTO: 35 % (ref 39–51)
HGB BLD-MCNC: 12 G/DL (ref 13.5–17.5)
LYMPHOCYTES NFR BLD AUTO: 1.1 /CMM (ref 0.8–4.8)
LYMPHOCYTES NFR BLD AUTO: 18.5 % (ref 20–44)
MCHC RBC AUTO-ENTMCNC: 34 G/DL (ref 31–36)
MCV RBC AUTO: 93 FL (ref 80–96)
MONOCYTES NFR BLD AUTO: 0.5 /CMM (ref 0.1–1.3)
MONOCYTES NFR BLD AUTO: 9 % (ref 2–12)
NEUTROPHILS # BLD AUTO: 4 /CMM (ref 1.8–8.9)
NEUTROPHILS NFR BLD AUTO: 67.7 % (ref 43–81)
PLATELET # BLD AUTO: 106 /CMM (ref 150–450)
POTASSIUM SERPL-SCNC: 3.9 MMOL/L (ref 3.5–5.1)
RBC # BLD AUTO: 3.77 MIL/UL (ref 4.5–6)
SODIUM SERPL-SCNC: 137 MMOL/L (ref 136–145)
WBC NRBC COR # BLD AUTO: 5.9 K/UL (ref 4.3–11)

## 2021-04-08 RX ADMIN — Medication SCH EACH: at 21:39

## 2021-04-08 RX ADMIN — MONTELUKAST SODIUM SCH MG: 10 TABLET, FILM COATED ORAL at 08:07

## 2021-04-08 RX ADMIN — Medication SCH APPLIC: at 08:18

## 2021-04-08 RX ADMIN — TRAZODONE HYDROCHLORIDE SCH MG: 50 TABLET ORAL at 21:27

## 2021-04-08 RX ADMIN — Medication SCH EACH: at 13:00

## 2021-04-08 RX ADMIN — INSULIN HUMAN PRN UNITS: 100 INJECTION, SOLUTION PARENTERAL at 22:28

## 2021-04-08 RX ADMIN — HYDROCODONE BITARTRATE AND ACETAMINOPHEN PRN TAB: 10; 325 TABLET ORAL at 12:57

## 2021-04-08 RX ADMIN — ALPRAZOLAM PRN MG: 1 TABLET ORAL at 21:32

## 2021-04-08 RX ADMIN — HYDROCODONE BITARTRATE AND ACETAMINOPHEN PRN TAB: 10; 325 TABLET ORAL at 08:30

## 2021-04-08 RX ADMIN — Medication SCH EACH: at 07:56

## 2021-04-08 RX ADMIN — Medication SCH EACH: at 17:10

## 2021-04-08 RX ADMIN — ENOXAPARIN SODIUM SCH MG: 40 INJECTION SUBCUTANEOUS at 08:18

## 2021-04-08 RX ADMIN — Medication PRN MG: at 17:02

## 2021-04-08 RX ADMIN — INSULIN HUMAN PRN UNITS: 100 INJECTION, SOLUTION PARENTERAL at 17:12

## 2021-04-08 RX ADMIN — MAGNESIUM HYDROXIDE PRN ML: 400 SUSPENSION ORAL at 17:26

## 2021-04-08 RX ADMIN — INSULIN HUMAN PRN UNITS: 100 INJECTION, SOLUTION PARENTERAL at 13:04

## 2021-04-08 RX ADMIN — ZOLPIDEM TARTRATE PRN MG: 5 TABLET, FILM COATED ORAL at 21:28

## 2021-04-08 RX ADMIN — Medication PRN MG: at 14:26

## 2021-04-08 RX ADMIN — HYDROCODONE BITARTRATE AND ACETAMINOPHEN PRN TAB: 10; 325 TABLET ORAL at 20:36

## 2021-04-08 RX ADMIN — PANTOPRAZOLE SODIUM SCH MG: 40 TABLET, DELAYED RELEASE ORAL at 08:07

## 2021-04-08 RX ADMIN — Medication SCH EACH: at 22:21

## 2021-04-08 NOTE — NUR
ms lvn notes

Trazadone 400 mg po given as ordered as well as his Ambien and Xanax po per patient 
requested. he still refusing his blood sugar checked for tonight and wants to do it in the 
morning. Patient stated that he wants to sleep now. he still the same with attitude 
screaming when you can't give on time what he wants even you explained to him that you have 
emergency that needs to prioritize first.

## 2021-04-08 NOTE — NUR
ms rn

patient went to the bathroom w/ physical therapy, went back to bed and noted to have slight 
bleeding to surgical site and has 2.5 cm opening, picture taken and notified Tremaine hester w/order 
made and carried out.

## 2021-04-08 NOTE — NUR
ms lvn notes

 checked the patient with  maybe he change his mood now if its ok to check his blood 
sugar and to have blood draw for some blood test that he refused earlier. pt seen watching 
tv and seems calmed now. blood draw done by lab  tech and I'll checked the blood sugar and  
came out 266. and 6 units of insulin quintin sq as ordered .kept him warm and comfortable at all 
times. will continue monitoring. place call light at reach.

## 2021-04-08 NOTE — NUR
ms rn

hip xray done w/ negative result, open wound covered w/ mepilex, was able to take picture at 
sacral wound also,patient has been refusing since admission w/ treatment done, wound eval  
ordered

## 2021-04-08 NOTE — NUR
MS RN OPENING NOTES

PATIENT IN BED, ASLEEP BUT EASILY AROUSABLE. NO MANIFESTATION OF PAIN OR DISCOMFORT. 
BREATHING EVEN AND NON-LABORED. LEFT UPPER ARM IV NOTED, NO S/S OF INFILTRATION, PATENT, 
INTACT. SAFETY MEASURES IN PLACE: BED IN LOWEST LOCKED WITH SIDERAILS UP.  KEPT CLEAN AND 
DRY. KATARINA LIGHT WITHIN EASY REACH.

## 2021-04-08 NOTE — NUR
MS LVN CLOSING NOTES

 PT REMAIN SLEEPING BUT AROUSE EASILY. PATIENT REFUSED TO HAVE BLOOD DRAW EVEN WE EXPLAINED 
TO HIM WHY HE NEEDS IT, HE ALSO REFUSED TO DO BLOOD TEST AS WELL. IVF STILL NOT INFUSING PER 
PT REQUEST. SLEPT WELL AND STABLE THROUGHOUT THE NIGHT .KEPT HIM WARM AND COMFORTABLE AT ALL 
TIMES. WILL ENDORSE TO AM NURSE FOR CONTINUITY OF CARE. PLACE CALL LIGHT AT REACH.

## 2021-04-08 NOTE — NUR
ms lvn notes

 Patient sleeping at this time without any distress noted. Breathing even and unlabored. 
kept him warm and comfortable at all times. will continue monitoring. call light at reach.

## 2021-04-08 NOTE — NUR
ms lvn initiial notes

 c/o left hip pain , norco tablet given as ordered. No signs of any distress noted. 
Encourage pt to turn himself as he can or call us if he needs some help.  pt requested to 
have his night medication at 9 pm. kept him warm and comfortable at all times. place call 
light at reach. will continue monitoring.

## 2021-04-08 NOTE — NUR
MS RN CLOSING NOTES

PATIENT IN BED, ASLEEP BUT EASILY AROUSABLE. NO SOB NOTED. LEFT UPPER ARM IV INTACT, NO S/S 
OF INFILTRATION. ALL DUE MEDS ARE GIVEN AS ORDERED, NO ADR NOTED, TOLERATED WELL. WOUND 
TREATMENT DONE AS ORDERED. TURN AND REPOSITION. KEPT CLEAN AND DRY. SAFETY MEASURES IN 
PLACE: YARELI IN LOWEST LOCKED POSITION. ALL NEEDS AND CARE PROVIDED PROMPTLY. CALL LIGHT 
WITHIN EASY REACH.

## 2021-04-09 VITALS — DIASTOLIC BLOOD PRESSURE: 96 MMHG | SYSTOLIC BLOOD PRESSURE: 128 MMHG

## 2021-04-09 LAB
BASOPHILS # BLD AUTO: 0 /CMM (ref 0–0.2)
BASOPHILS NFR BLD AUTO: 0.4 % (ref 0–2)
BUN SERPL-MCNC: 10 MG/DL (ref 7–18)
CALCIUM SERPL-MCNC: 8.3 MG/DL (ref 8.5–10.1)
CHLORIDE SERPL-SCNC: 101 MMOL/L (ref 98–107)
CO2 SERPL-SCNC: 30 MMOL/L (ref 21–32)
CREAT SERPL-MCNC: 0.6 MG/DL (ref 0.6–1.3)
EOSINOPHIL NFR BLD AUTO: 6.7 % (ref 0–6)
GLUCOSE SERPL-MCNC: 186 MG/DL (ref 74–106)
HCT VFR BLD AUTO: 36 % (ref 39–51)
HGB BLD-MCNC: 12.3 G/DL (ref 13.5–17.5)
LYMPHOCYTES NFR BLD AUTO: 1.3 /CMM (ref 0.8–4.8)
LYMPHOCYTES NFR BLD AUTO: 25.8 % (ref 20–44)
MCHC RBC AUTO-ENTMCNC: 35 G/DL (ref 31–36)
MCV RBC AUTO: 94 FL (ref 80–96)
MONOCYTES NFR BLD AUTO: 0.5 /CMM (ref 0.1–1.3)
MONOCYTES NFR BLD AUTO: 10.1 % (ref 2–12)
NEUTROPHILS # BLD AUTO: 2.9 /CMM (ref 1.8–8.9)
NEUTROPHILS NFR BLD AUTO: 57 % (ref 43–81)
PLATELET # BLD AUTO: 102 /CMM (ref 150–450)
POTASSIUM SERPL-SCNC: 3.8 MMOL/L (ref 3.5–5.1)
RBC # BLD AUTO: 3.8 MIL/UL (ref 4.5–6)
SODIUM SERPL-SCNC: 137 MMOL/L (ref 136–145)
WBC NRBC COR # BLD AUTO: 5.1 K/UL (ref 4.3–11)

## 2021-04-09 RX ADMIN — INSULIN HUMAN PRN UNITS: 100 INJECTION, SOLUTION PARENTERAL at 11:47

## 2021-04-09 RX ADMIN — Medication SCH EACH: at 11:42

## 2021-04-09 RX ADMIN — HYDROCODONE BITARTRATE AND ACETAMINOPHEN PRN TAB: 10; 325 TABLET ORAL at 12:16

## 2021-04-09 RX ADMIN — Medication PRN MG: at 13:51

## 2021-04-09 RX ADMIN — Medication SCH APPLIC: at 08:17

## 2021-04-09 RX ADMIN — Medication SCH EACH: at 07:30

## 2021-04-09 RX ADMIN — Medication PRN MG: at 09:34

## 2021-04-09 RX ADMIN — MONTELUKAST SODIUM SCH MG: 10 TABLET, FILM COATED ORAL at 08:16

## 2021-04-09 RX ADMIN — MAGNESIUM HYDROXIDE PRN ML: 400 SUSPENSION ORAL at 12:16

## 2021-04-09 RX ADMIN — PANTOPRAZOLE SODIUM SCH MG: 40 TABLET, DELAYED RELEASE ORAL at 08:16

## 2021-04-09 RX ADMIN — ENOXAPARIN SODIUM SCH MG: 40 INJECTION SUBCUTANEOUS at 08:18

## 2021-04-09 NOTE — NUR
MS LVN CLOSING NOTES 

Pt . remains sleeping but arouse easily , refused to have sponge bath and wound care and 
blood sugar check.  as well and saying "do it after breakfast please ,I want to sleep more". 
stable throughout the night after he got his medication. kept him warm and comfortable at 
all times. will endorse to am nurse for continuity of care. place call light at reach.

## 2021-04-09 NOTE — NUR
PATIENT COMPLAINED OF PAIN IN THE RIGHT HIP AREA 7/10. GAVE PRN MEDICATION NORCO AS ORDERED. 
WILL REASSESS

## 2021-04-09 NOTE — NUR
MS RN OPENING NOTES



RECEIVED PATIENT IN BED, ASLEEP. PATIENT ON ROOM AIR; BREATHING EVEN AND UNLABORED; NO SOB 
PRESENT AT THIS TIME. NO S/S OF PAIN SUCH AS FACIAL GRIMACING, MOANING OR GUARDING NOTED. IV 
ACCESS AT PAMELA G # 20 PRESENT AND INTACT. SAFETY PRECAUTIONS IN PLACE; BED IN LOW POSITION 
AND UP, RAILS UP X2, CALL LIGHT WITHIN REACH. WILL CONTINUE TO MONITOR PATIENT.

## 2021-04-09 NOTE — NUR
MS RN DISCHARGE NOTE

RECEIVED ORDER FOR DISCHARGE. PATIENT IS A/OX4; ABLE TO MAKE NEEDS KNOWN. ON ROOM AIR, 
TOLERATING WELL WITH NO SOB.  NO S/SX OF DISTRESS. PATIENT IS ABLE TO AMBULATE WITH WALKER 
IV. REMOVED PATIENT TOLERATED WELL. SKIN NOTED WITH PRESSURE ULCER ON SACRUM AND  L HIP 
SURGICAL SITE. DISCHARGE INSTRUCTIONS WERE GIVEN BOTH IN ORAL AND WRITTEN FORM. PATIENT WAS 
PICKED UP BY TWO EMTS FOR [facility]. PATIENT LEFT IN STABLE CONDITION.

## 2021-04-09 NOTE — NUR
MS LVN NOTES

 PT SLEEPING COMFORTABLY IN BED, BREATHING EVEN AND UNLABORED NOT IN ANY DISTRESS NOTED. 
KEPT HIM WARM AND COMFORTABLE AT ALL TIMES. WILL CONTINUE MONITORING. CALL LIGHT AT REACH.

## 2021-04-11 ENCOUNTER — HOSPITAL ENCOUNTER (EMERGENCY)
Dept: HOSPITAL 54 - ER | Age: 71
Discharge: NURSING, REHAB OR LONG-TERM CARE | End: 2021-04-11
Payer: MEDICARE

## 2021-04-11 VITALS — HEIGHT: 66 IN | BODY MASS INDEX: 27.97 KG/M2 | WEIGHT: 174 LBS

## 2021-04-11 VITALS — SYSTOLIC BLOOD PRESSURE: 142 MMHG | DIASTOLIC BLOOD PRESSURE: 68 MMHG

## 2021-04-11 DIAGNOSIS — Z88.0: ICD-10-CM

## 2021-04-11 DIAGNOSIS — T84.021A: Primary | ICD-10-CM

## 2021-04-11 DIAGNOSIS — I10: ICD-10-CM

## 2021-04-11 DIAGNOSIS — Z79.899: ICD-10-CM

## 2021-04-11 DIAGNOSIS — E11.9: ICD-10-CM

## 2021-04-11 PROCEDURE — 72170 X-RAY EXAM OF PELVIS: CPT

## 2021-04-11 PROCEDURE — 96372 THER/PROPH/DIAG INJ SC/IM: CPT

## 2021-04-11 PROCEDURE — 27265 TREAT HIP DISLOCATION: CPT

## 2021-04-11 PROCEDURE — 99152 MOD SED SAME PHYS/QHP 5/>YRS: CPT

## 2021-04-11 PROCEDURE — 73501 X-RAY EXAM HIP UNI 1 VIEW: CPT

## 2021-04-11 PROCEDURE — G0500 MOD SEDAT ENDO SERVICE >5YRS: HCPCS

## 2021-04-11 PROCEDURE — 99285 EMERGENCY DEPT VISIT HI MDM: CPT

## 2021-04-11 RX ADMIN — PROPOFOL ONE MG: 10 INJECTION, EMULSION INTRAVENOUS at 17:34

## 2021-04-11 RX ADMIN — PROPOFOL ONE MG: 10 INJECTION, EMULSION INTRAVENOUS at 17:38

## 2021-04-11 RX ADMIN — PROPOFOL ONE MG: 10 INJECTION, EMULSION INTRAVENOUS at 17:35

## 2021-04-11 RX ADMIN — PROPOFOL ONE MG: 10 INJECTION, EMULSION INTRAVENOUS at 17:26

## 2021-04-11 NOTE — NUR
DC home intruction given to patient called [facility] report 
given to Abel CISSE .

## 2021-04-13 ENCOUNTER — HOSPITAL ENCOUNTER (EMERGENCY)
Dept: HOSPITAL 54 - ER | Age: 71
LOS: 1 days | Discharge: NURSING, REHAB OR LONG-TERM CARE | End: 2021-04-14
Payer: MEDICARE

## 2021-04-13 VITALS — HEIGHT: 70 IN | BODY MASS INDEX: 23.62 KG/M2 | WEIGHT: 165 LBS
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
## 2021-04-13 DIAGNOSIS — T84.021A: Primary | ICD-10-CM

## 2021-04-13 DIAGNOSIS — Z79.899: ICD-10-CM

## 2021-04-13 DIAGNOSIS — Z20.822: ICD-10-CM
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
## 2021-04-13 DIAGNOSIS — E11.9: ICD-10-CM

## 2021-04-13 DIAGNOSIS — I10: ICD-10-CM

## 2021-04-13 LAB
ALBUMIN SERPL BCP-MCNC: 2.9 G/DL (ref 3.4–5)
ALP SERPL-CCNC: 296 U/L (ref 46–116)
ALT SERPL W P-5'-P-CCNC: 114 U/L (ref 12–78)
AST SERPL W P-5'-P-CCNC: 82 U/L (ref 15–37)
BASOPHILS # BLD AUTO: 0.1 /CMM (ref 0–0.2)
BASOPHILS NFR BLD AUTO: 0.7 % (ref 0–2)
BILIRUB DIRECT SERPL-MCNC: 0.2 MG/DL (ref 0–0.2)
BILIRUB SERPL-MCNC: 0.5 MG/DL (ref 0.2–1)
BUN SERPL-MCNC: 12 MG/DL (ref 7–18)
CALCIUM SERPL-MCNC: 9 MG/DL (ref 8.5–10.1)
CHLORIDE SERPL-SCNC: 98 MMOL/L (ref 98–107)
CO2 SERPL-SCNC: 33 MMOL/L (ref 21–32)
CREAT SERPL-MCNC: 0.8 MG/DL (ref 0.6–1.3)
EOSINOPHIL NFR BLD AUTO: 4 % (ref 0–6)
GLUCOSE SERPL-MCNC: 197 MG/DL (ref 74–106)
HCT VFR BLD AUTO: 37 % (ref 39–51)
HGB BLD-MCNC: 12.7 G/DL (ref 13.5–17.5)
LYMPHOCYTES NFR BLD AUTO: 1.2 /CMM (ref 0.8–4.8)
LYMPHOCYTES NFR BLD AUTO: 14.7 % (ref 20–44)
MCHC RBC AUTO-ENTMCNC: 34 G/DL (ref 31–36)
MCV RBC AUTO: 95 FL (ref 80–96)
MONOCYTES NFR BLD AUTO: 0.6 /CMM (ref 0.1–1.3)
MONOCYTES NFR BLD AUTO: 7.3 % (ref 2–12)
NEUTROPHILS # BLD AUTO: 6.1 /CMM (ref 1.8–8.9)
NEUTROPHILS NFR BLD AUTO: 73.3 % (ref 43–81)
PLATELET # BLD AUTO: 102 /CMM (ref 150–450)
POTASSIUM SERPL-SCNC: 3.9 MMOL/L (ref 3.5–5.1)
PROT SERPL-MCNC: 9 G/DL (ref 6.4–8.2)
RBC # BLD AUTO: 3.93 MIL/UL (ref 4.5–6)
SODIUM SERPL-SCNC: 132 MMOL/L (ref 136–145)
WBC NRBC COR # BLD AUTO: 8.3 K/UL (ref 4.3–11)

## 2021-04-13 PROCEDURE — 99152 MOD SED SAME PHYS/QHP 5/>YRS: CPT

## 2021-04-13 PROCEDURE — 73503 X-RAY EXAM HIP UNI 4/> VIEWS: CPT

## 2021-04-13 PROCEDURE — 36415 COLL VENOUS BLD VENIPUNCTURE: CPT

## 2021-04-13 PROCEDURE — 85730 THROMBOPLASTIN TIME PARTIAL: CPT

## 2021-04-13 PROCEDURE — U0003 INFECTIOUS AGENT DETECTION BY NUCLEIC ACID (DNA OR RNA); SEVERE ACUTE RESPIRATORY SYNDROME CORONAVIRUS 2 (SARS-COV-2) (CORONAVIRUS DISEASE [COVID-19]), AMPLIFIED PROBE TECHNIQUE, MAKING USE OF HIGH THROUGHPUT TECHNOLOGIES AS DESCRIBED BY CMS-2020-01-R: HCPCS

## 2021-04-13 PROCEDURE — 87426 SARSCOV CORONAVIRUS AG IA: CPT

## 2021-04-13 PROCEDURE — 99285 EMERGENCY DEPT VISIT HI MDM: CPT

## 2021-04-13 PROCEDURE — 96375 TX/PRO/DX INJ NEW DRUG ADDON: CPT

## 2021-04-13 PROCEDURE — C9803 HOPD COVID-19 SPEC COLLECT: HCPCS

## 2021-04-13 PROCEDURE — 85025 COMPLETE CBC W/AUTO DIFF WBC: CPT

## 2021-04-13 PROCEDURE — 80076 HEPATIC FUNCTION PANEL: CPT

## 2021-04-13 PROCEDURE — G0500 MOD SEDAT ENDO SERVICE >5YRS: HCPCS

## 2021-04-13 PROCEDURE — 27265 TREAT HIP DISLOCATION: CPT

## 2021-04-13 PROCEDURE — 80048 BASIC METABOLIC PNL TOTAL CA: CPT

## 2021-04-13 PROCEDURE — 96374 THER/PROPH/DIAG INJ IV PUSH: CPT

## 2021-04-13 NOTE — NUR
rt called to pt bedside for standby during hip reduction. pt on 6l n/c with spo2 98% resting 
comfortably with no resp distress.  pt placed on non rebreather post procedure to maintain 
100% spo2. pt returned to n/c 6l once alert and oriented.

## 2021-04-13 NOTE — NUR
PT AWAKE, AAOX4. VITAL SIGNS STABLE. RESPIRATIONS EVEN AND UNLABORED. STILL ON 
CONTINUOUS CARDIAC MONITOR AND PULSE OX, WILL CONTINUE TO MONITOR

## 2021-04-13 NOTE — NUR
PT ARTIE FROM [facility] C/O POSSIBLE L HIP DISLOCATION. PT STATES HE WAS 
SITTING IN WHEELCHAIR, TURNED, AND FELT HIP "POP OUT". PT HAS HISTORY OF 
MULTIPLE HIP DISLOCATIONS. PT AAOX4. RESPIRATIONS EVEN AND UNLABORED. VITAL 
SIGNS STABLE. NO ACUTE DISTRESS NOTED AT THIS TIME. PENDING MD ABRAHAM

## 2021-04-14 ENCOUNTER — HOSPITAL ENCOUNTER (EMERGENCY)
Dept: HOSPITAL 54 - ER | Age: 71
Discharge: HOME | End: 2021-04-14
Payer: MEDICARE

## 2021-04-14 VITALS — BODY MASS INDEX: 23.62 KG/M2 | WEIGHT: 165 LBS | HEIGHT: 70 IN

## 2021-04-14 VITALS — SYSTOLIC BLOOD PRESSURE: 110 MMHG | DIASTOLIC BLOOD PRESSURE: 67 MMHG

## 2021-04-14 VITALS — DIASTOLIC BLOOD PRESSURE: 87 MMHG | SYSTOLIC BLOOD PRESSURE: 132 MMHG

## 2021-04-14 DIAGNOSIS — I10: ICD-10-CM

## 2021-04-14 DIAGNOSIS — Z88.0: ICD-10-CM

## 2021-04-14 DIAGNOSIS — E11.9: ICD-10-CM

## 2021-04-14 DIAGNOSIS — Z79.899: ICD-10-CM

## 2021-04-14 DIAGNOSIS — T84.021A: Primary | ICD-10-CM

## 2021-04-14 PROCEDURE — 27265 TREAT HIP DISLOCATION: CPT

## 2021-04-14 PROCEDURE — 99152 MOD SED SAME PHYS/QHP 5/>YRS: CPT

## 2021-04-14 PROCEDURE — G0500 MOD SEDAT ENDO SERVICE >5YRS: HCPCS

## 2021-04-14 PROCEDURE — 99285 EMERGENCY DEPT VISIT HI MDM: CPT

## 2021-04-14 PROCEDURE — 96375 TX/PRO/DX INJ NEW DRUG ADDON: CPT

## 2021-04-14 PROCEDURE — 73503 X-RAY EXAM HIP UNI 4/> VIEWS: CPT

## 2021-04-14 PROCEDURE — 96374 THER/PROPH/DIAG INJ IV PUSH: CPT

## 2021-04-14 NOTE — NUR
PT ARTIE FROM [facility] C/O POSSIBLE L HIP DISLOCATION. NOTED SHORTENING 
L LOWER EXT. PT STATES HE WAS SITTING IN WHEELCHAIR AND FELT "HIP POP OUT". PT 
RECENTLY SEEN AND DISCHARGED FROM [facility] ER YESTERDAY FOR SAME COMPLAINT, PT HAS 
HISTORY OF MULTIPLE HIP DISLOCATION. PT AAOX4. VITAL SIGNS STABLE. RESPIRATIONS 
EVEN AND UNLABORED. NO ACUTE DISTRESS NOTED AT THIS TIME. WILL CONTINUE TO 
MONITOR

## 2021-04-17 ENCOUNTER — HOSPITAL ENCOUNTER (EMERGENCY)
Dept: HOSPITAL 54 - ER | Age: 71
LOS: 1 days | Discharge: NURSING, REHAB OR LONG-TERM CARE | End: 2021-04-18
Payer: MEDICARE

## 2021-04-17 VITALS — BODY MASS INDEX: 25.01 KG/M2 | WEIGHT: 165 LBS | HEIGHT: 68 IN

## 2021-04-17 VITALS — DIASTOLIC BLOOD PRESSURE: 74 MMHG | SYSTOLIC BLOOD PRESSURE: 119 MMHG

## 2021-04-17 DIAGNOSIS — T84.021A: Primary | ICD-10-CM

## 2021-04-17 DIAGNOSIS — Z79.899: ICD-10-CM

## 2021-04-17 DIAGNOSIS — I10: ICD-10-CM

## 2021-04-17 DIAGNOSIS — E11.9: ICD-10-CM

## 2021-04-17 DIAGNOSIS — Z88.0: ICD-10-CM

## 2021-04-17 LAB
BASOPHILS # BLD AUTO: 0 /CMM (ref 0–0.2)
BASOPHILS NFR BLD AUTO: 0.8 % (ref 0–2)
BUN SERPL-MCNC: 9 MG/DL (ref 7–18)
CALCIUM SERPL-MCNC: 8.7 MG/DL (ref 8.5–10.1)
CHLORIDE SERPL-SCNC: 101 MMOL/L (ref 98–107)
CO2 SERPL-SCNC: 36 MMOL/L (ref 21–32)
CREAT SERPL-MCNC: 0.8 MG/DL (ref 0.6–1.3)
EOSINOPHIL NFR BLD AUTO: 3.9 % (ref 0–6)
GLUCOSE SERPL-MCNC: 146 MG/DL (ref 74–106)
HCT VFR BLD AUTO: 37 % (ref 39–51)
HGB BLD-MCNC: 12.7 G/DL (ref 13.5–17.5)
LYMPHOCYTES NFR BLD AUTO: 1.1 /CMM (ref 0.8–4.8)
LYMPHOCYTES NFR BLD AUTO: 19.5 % (ref 20–44)
MCHC RBC AUTO-ENTMCNC: 34 G/DL (ref 31–36)
MCV RBC AUTO: 95 FL (ref 80–96)
MONOCYTES NFR BLD AUTO: 0.4 /CMM (ref 0.1–1.3)
MONOCYTES NFR BLD AUTO: 7.8 % (ref 2–12)
NEUTROPHILS # BLD AUTO: 3.7 /CMM (ref 1.8–8.9)
NEUTROPHILS NFR BLD AUTO: 68 % (ref 43–81)
PLATELET # BLD AUTO: 139 /CMM (ref 150–450)
POTASSIUM SERPL-SCNC: 4.4 MMOL/L (ref 3.5–5.1)
RBC # BLD AUTO: 3.95 MIL/UL (ref 4.5–6)
SODIUM SERPL-SCNC: 139 MMOL/L (ref 136–145)
WBC NRBC COR # BLD AUTO: 5.5 K/UL (ref 4.3–11)

## 2021-04-17 PROCEDURE — 85610 PROTHROMBIN TIME: CPT

## 2021-04-17 PROCEDURE — 85730 THROMBOPLASTIN TIME PARTIAL: CPT

## 2021-04-17 PROCEDURE — 99152 MOD SED SAME PHYS/QHP 5/>YRS: CPT

## 2021-04-17 PROCEDURE — 73501 X-RAY EXAM HIP UNI 1 VIEW: CPT

## 2021-04-17 PROCEDURE — 80048 BASIC METABOLIC PNL TOTAL CA: CPT

## 2021-04-17 PROCEDURE — 36415 COLL VENOUS BLD VENIPUNCTURE: CPT

## 2021-04-17 PROCEDURE — 85025 COMPLETE CBC W/AUTO DIFF WBC: CPT

## 2021-04-17 PROCEDURE — 99285 EMERGENCY DEPT VISIT HI MDM: CPT

## 2021-04-17 PROCEDURE — 27265 TREAT HIP DISLOCATION: CPT

## 2021-04-17 PROCEDURE — G0500 MOD SEDAT ENDO SERVICE >5YRS: HCPCS

## 2021-04-17 PROCEDURE — 72170 X-RAY EXAM OF PELVIS: CPT

## 2021-04-17 NOTE — NUR
PATIENT CAME TO ER BED 11 C/O LEFT HIP DISLOCATED. PATIENT IS BIB EMS FROM 
[facility] FOR DISLOCATED LEFT HIP W/ LEFT LEG MEDIALLY ROTATED W/ 
SHORTENING. IV RFA 18G STARTED. PATIENT IS AAOX4. NOS OB. BREATHING EVENLY AND 
UNLABORED ON ROOM AIR. CONNECTED TO THE CARDIAC MONITOR.

## 2021-05-29 ENCOUNTER — HOSPITAL ENCOUNTER (EMERGENCY)
Dept: HOSPITAL 54 - ER | Age: 71
LOS: 1 days | Discharge: ANOTHER HOSPITAL | End: 2021-05-30
Payer: MEDICARE

## 2021-05-29 VITALS — HEIGHT: 67 IN | BODY MASS INDEX: 25.43 KG/M2 | WEIGHT: 162 LBS

## 2021-05-29 DIAGNOSIS — Z88.0: ICD-10-CM

## 2021-05-29 DIAGNOSIS — Z20.822: ICD-10-CM

## 2021-05-29 DIAGNOSIS — Z79.899: ICD-10-CM

## 2021-05-29 DIAGNOSIS — Z79.84: ICD-10-CM

## 2021-05-29 DIAGNOSIS — I10: ICD-10-CM

## 2021-05-29 DIAGNOSIS — E11.9: ICD-10-CM

## 2021-05-29 DIAGNOSIS — T84.021A: Primary | ICD-10-CM

## 2021-05-29 LAB
BASOPHILS # BLD AUTO: 0 /CMM (ref 0–0.2)
BASOPHILS NFR BLD AUTO: 0.6 % (ref 0–2)
BUN SERPL-MCNC: 13 MG/DL (ref 7–18)
CALCIUM SERPL-MCNC: 8.3 MG/DL (ref 8.5–10.1)
CHLORIDE SERPL-SCNC: 105 MMOL/L (ref 98–107)
CK SERPL-CCNC: 76 U/L (ref 39–308)
CO2 SERPL-SCNC: 29 MMOL/L (ref 21–32)
CREAT SERPL-MCNC: 0.7 MG/DL (ref 0.6–1.3)
EOSINOPHIL NFR BLD AUTO: 3 % (ref 0–6)
ETHANOL SERPL-MCNC: < 3 MG/DL (ref 0–0)
GLUCOSE SERPL-MCNC: 131 MG/DL (ref 74–106)
HCT VFR BLD AUTO: 31 % (ref 39–51)
HGB BLD-MCNC: 10.2 G/DL (ref 13.5–17.5)
LYMPHOCYTES NFR BLD AUTO: 1 /CMM (ref 0.8–4.8)
LYMPHOCYTES NFR BLD AUTO: 17.2 % (ref 20–44)
MCHC RBC AUTO-ENTMCNC: 33 G/DL (ref 31–36)
MCV RBC AUTO: 91 FL (ref 80–96)
MONOCYTES NFR BLD AUTO: 0.6 /CMM (ref 0.1–1.3)
MONOCYTES NFR BLD AUTO: 9.8 % (ref 2–12)
NEUTROPHILS # BLD AUTO: 3.9 /CMM (ref 1.8–8.9)
NEUTROPHILS NFR BLD AUTO: 69.4 % (ref 43–81)
PLATELET # BLD AUTO: 169 /CMM (ref 150–450)
POTASSIUM SERPL-SCNC: 3.1 MMOL/L (ref 3.5–5.1)
RBC # BLD AUTO: 3.38 MIL/UL (ref 4.5–6)
SODIUM SERPL-SCNC: 141 MMOL/L (ref 136–145)
WBC NRBC COR # BLD AUTO: 5.7 K/UL (ref 4.3–11)

## 2021-05-29 PROCEDURE — 82550 ASSAY OF CK (CPK): CPT

## 2021-05-29 PROCEDURE — G0500 MOD SEDAT ENDO SERVICE >5YRS: HCPCS

## 2021-05-29 PROCEDURE — 80320 DRUG SCREEN QUANTALCOHOLS: CPT

## 2021-05-29 PROCEDURE — 96361 HYDRATE IV INFUSION ADD-ON: CPT

## 2021-05-29 PROCEDURE — G0480 DRUG TEST DEF 1-7 CLASSES: HCPCS

## 2021-05-29 PROCEDURE — 73501 X-RAY EXAM HIP UNI 1 VIEW: CPT

## 2021-05-29 PROCEDURE — 86850 RBC ANTIBODY SCREEN: CPT

## 2021-05-29 PROCEDURE — C9803 HOPD COVID-19 SPEC COLLECT: HCPCS

## 2021-05-29 PROCEDURE — 96376 TX/PRO/DX INJ SAME DRUG ADON: CPT

## 2021-05-29 PROCEDURE — 80048 BASIC METABOLIC PNL TOTAL CA: CPT

## 2021-05-29 PROCEDURE — 96374 THER/PROPH/DIAG INJ IV PUSH: CPT

## 2021-05-29 PROCEDURE — 27265 TREAT HIP DISLOCATION: CPT

## 2021-05-29 PROCEDURE — 87426 SARSCOV CORONAVIRUS AG IA: CPT

## 2021-05-29 PROCEDURE — 71045 X-RAY EXAM CHEST 1 VIEW: CPT

## 2021-05-29 PROCEDURE — 85025 COMPLETE CBC W/AUTO DIFF WBC: CPT

## 2021-05-29 PROCEDURE — 99152 MOD SED SAME PHYS/QHP 5/>YRS: CPT

## 2021-05-29 PROCEDURE — 96375 TX/PRO/DX INJ NEW DRUG ADDON: CPT

## 2021-05-29 PROCEDURE — 99285 EMERGENCY DEPT VISIT HI MDM: CPT

## 2021-05-29 PROCEDURE — 85730 THROMBOPLASTIN TIME PARTIAL: CPT

## 2021-05-29 PROCEDURE — 36415 COLL VENOUS BLD VENIPUNCTURE: CPT

## 2021-05-29 NOTE — NUR
[id number] FOR POSSIBLE L HIP DISLOCATION S/P GLF 1 HOUR  [facility]G 
FENTANYL GIVEN IVP PTA. PT AAOX4, VSS. RR EVEN & UNLABORED. DENIES HEAD INJURY, 
CP, SOB, DIZZINESS, N/V AT THIS TIME. PT SEEN & EVAL'D BY DR. CERDA. WILL 
CONT TO MONITOR.

## 2021-05-29 NOTE — NUR
DR. CERDA UNABLE TO REDUCE LT HIP. PT SLEEPY, EASILY AWAKEN BY VERBAL 
STIMULI. RR EVEN & UNLABORED. DENIES CP, SOB, DIZZINESS, N/V AT THIS TIME. ON 
TELE, SR. WILL CONT TO MONITOR.

## 2021-05-29 NOTE — NUR
PT ASLEEP, EASILY AWAKEN BY VERBAL STIMULI. DENIES CP, SOB, DIZZINESS, N/V AT 
THIS TIME. WILL CONT TO MONITOR.

## 2021-05-30 VITALS — SYSTOLIC BLOOD PRESSURE: 113 MMHG | DIASTOLIC BLOOD PRESSURE: 72 MMHG

## 2021-05-30 NOTE — NUR
THE PATIENT IS DISCHARGED GOING TO [facility]. THE PATIENT LEFT ER IN 
STABLE CONDITION VIA ARRANGED TRANSPO.

## 2021-05-30 NOTE — NUR
GOING TO [facility] ROOM 1510-1  CALL [phone] ACCEPTED UNDER DR. PFEIFFER.

## 2022-01-10 ENCOUNTER — HOSPITAL ENCOUNTER (INPATIENT)
Dept: HOSPITAL 12 - ER | Age: 72
LOS: 2 days | Discharge: NURSING, REHAB OR LONG-TERM CARE | DRG: 637 | End: 2022-01-12
Attending: INTERNAL MEDICINE | Admitting: INTERNAL MEDICINE
Payer: COMMERCIAL

## 2022-01-10 VITALS — BODY MASS INDEX: 24.44 KG/M2 | WEIGHT: 165 LBS | HEIGHT: 69 IN

## 2022-01-10 DIAGNOSIS — G89.4: ICD-10-CM

## 2022-01-10 DIAGNOSIS — F41.9: ICD-10-CM

## 2022-01-10 DIAGNOSIS — D68.59: ICD-10-CM

## 2022-01-10 DIAGNOSIS — F32.A: ICD-10-CM

## 2022-01-10 DIAGNOSIS — Z91.81: ICD-10-CM

## 2022-01-10 DIAGNOSIS — E87.1: ICD-10-CM

## 2022-01-10 DIAGNOSIS — N40.0: ICD-10-CM

## 2022-01-10 DIAGNOSIS — K21.9: ICD-10-CM

## 2022-01-10 DIAGNOSIS — Z91.19: ICD-10-CM

## 2022-01-10 DIAGNOSIS — E11.65: Primary | ICD-10-CM

## 2022-01-10 DIAGNOSIS — E78.5: ICD-10-CM

## 2022-01-10 DIAGNOSIS — M25.562: ICD-10-CM

## 2022-01-10 DIAGNOSIS — B95.61: ICD-10-CM

## 2022-01-10 DIAGNOSIS — M19.90: ICD-10-CM

## 2022-01-10 DIAGNOSIS — M25.462: ICD-10-CM

## 2022-01-10 DIAGNOSIS — Z79.84: ICD-10-CM

## 2022-01-10 DIAGNOSIS — Z96.643: ICD-10-CM

## 2022-01-10 DIAGNOSIS — I10: ICD-10-CM

## 2022-01-10 DIAGNOSIS — F17.210: ICD-10-CM

## 2022-01-10 DIAGNOSIS — Z20.822: ICD-10-CM

## 2022-01-10 DIAGNOSIS — J44.9: ICD-10-CM

## 2022-01-10 DIAGNOSIS — E43: ICD-10-CM

## 2022-01-10 DIAGNOSIS — Z79.899: ICD-10-CM

## 2022-01-10 DIAGNOSIS — Z96.653: ICD-10-CM

## 2022-01-10 DIAGNOSIS — N39.0: ICD-10-CM

## 2022-01-10 LAB
ALP SERPL-CCNC: 263 U/L (ref 50–136)
ALT SERPL W/O P-5'-P-CCNC: 62 U/L (ref 16–63)
AST SERPL-CCNC: 42 U/L (ref 15–37)
BILIRUB DIRECT SERPL-MCNC: 0.2 MG/DL (ref 0–0.2)
BILIRUB SERPL-MCNC: 0.7 MG/DL (ref 0.2–1)
BUN SERPL-MCNC: 16 MG/DL (ref 7–18)
CHLORIDE SERPL-SCNC: 95 MMOL/L (ref 98–107)
CO2 SERPL-SCNC: 29 MMOL/L (ref 21–32)
CREAT SERPL-MCNC: 0.8 MG/DL (ref 0.6–1.3)
GLUCOSE SERPL-MCNC: 332 MG/DL (ref 74–106)
HCT VFR BLD AUTO: 39.9 % (ref 36.7–47.1)
MCH RBC QN AUTO: 32.3 UUG (ref 23.8–33.4)
MCV RBC AUTO: 92.2 FL (ref 73–96.2)
PLATELET # BLD AUTO: 158 K/UL (ref 152–348)
POTASSIUM SERPL-SCNC: 4.2 MMOL/L (ref 3.5–5.1)
WS STN SPEC: 10 G/DL (ref 6.4–8.2)

## 2022-01-10 PROCEDURE — A4663 DIALYSIS BLOOD PRESSURE CUFF: HCPCS

## 2022-01-10 PROCEDURE — G0378 HOSPITAL OBSERVATION PER HR: HCPCS

## 2022-01-11 LAB
APPEARANCE UR: (no result)
BILIRUB UR QL STRIP: NEGATIVE
COLOR UR: YELLOW
DEPRECATED SQUAMOUS URNS QL MICRO: (no result) /HPF
GLUCOSE UR STRIP-MCNC: (no result) MG/DL
HGB UR QL STRIP: (no result)
KETONES UR STRIP-MCNC: NEGATIVE MG/DL
LEUKOCYTE ESTERASE UR QL STRIP: (no result)
NITRITE UR QL STRIP: POSITIVE
PH UR STRIP: 7.5 [PH] (ref 5–8)
SP GR UR STRIP: 1.02 (ref 1–1.03)
UROBILINOGEN UR STRIP-MCNC: 0.2 E.U./DL
WBC #/AREA URNS HPF: (no result) /HPF
WBC #/AREA URNS HPF: (no result) /HPF (ref 0–3)

## 2022-01-11 RX ADMIN — SODIUM CHLORIDE PRN UNIT: 9 INJECTION, SOLUTION INTRAVENOUS at 22:20

## 2022-01-11 RX ADMIN — Medication SCH EACH: at 21:52

## 2022-01-11 RX ADMIN — Medication SCH EACH: at 17:01

## 2022-01-12 LAB
ALP SERPL-CCNC: 233 U/L (ref 50–136)
ALT SERPL W/O P-5'-P-CCNC: 52 U/L (ref 16–63)
AST SERPL-CCNC: 44 U/L (ref 15–37)
BILIRUB SERPL-MCNC: 0.5 MG/DL (ref 0.2–1)
BUN SERPL-MCNC: 10 MG/DL (ref 7–18)
CHLORIDE SERPL-SCNC: 96 MMOL/L (ref 98–107)
CHOLEST SERPL-MCNC: 136 MG/DL (ref ?–200)
CO2 SERPL-SCNC: 33 MMOL/L (ref 21–32)
CREAT SERPL-MCNC: 0.7 MG/DL (ref 0.6–1.3)
GLUCOSE SERPL-MCNC: 180 MG/DL (ref 74–106)
HCT VFR BLD AUTO: 39.1 % (ref 36.7–47.1)
HDLC SERPL-MCNC: 38 MG/DL (ref 40–60)
MAGNESIUM SERPL-MCNC: 1.8 MG/DL (ref 1.8–2.4)
MCH RBC QN AUTO: 32.4 UUG (ref 23.8–33.4)
MCV RBC AUTO: 91.2 FL (ref 73–96.2)
PHOSPHATE SERPL-MCNC: 3.5 MG/DL (ref 2.5–4.9)
PLATELET # BLD AUTO: 150 K/UL (ref 152–348)
POTASSIUM SERPL-SCNC: 4.5 MMOL/L (ref 3.5–5.1)
TRIGL SERPL-MCNC: 61 MG/DL (ref 30–150)
TSH SERPL DL<=0.005 MIU/L-ACNC: 0.64 MIU/ML (ref 0.36–3.74)
WS STN SPEC: 9.2 G/DL (ref 6.4–8.2)

## 2022-01-12 RX ADMIN — Medication SCH EACH: at 12:07

## 2022-01-12 RX ADMIN — Medication SCH EACH: at 08:45

## 2022-01-12 RX ADMIN — SODIUM CHLORIDE PRN UNIT: 9 INJECTION, SOLUTION INTRAVENOUS at 12:14

## 2023-04-13 ENCOUNTER — HOSPITAL ENCOUNTER (EMERGENCY)
Dept: HOSPITAL 12 - ER | Age: 73
Discharge: NURSING, REHAB OR LONG-TERM CARE | End: 2023-04-13
Payer: MEDICARE

## 2023-04-13 VITALS — HEIGHT: 70 IN | WEIGHT: 184 LBS | BODY MASS INDEX: 26.34 KG/M2

## 2023-04-13 DIAGNOSIS — Z00.00: Primary | ICD-10-CM

## 2023-04-13 DIAGNOSIS — E11.9: ICD-10-CM

## 2023-04-13 DIAGNOSIS — Z79.4: ICD-10-CM

## 2023-04-13 DIAGNOSIS — Z79.899: ICD-10-CM

## 2023-04-13 DIAGNOSIS — K21.9: ICD-10-CM

## 2023-04-13 DIAGNOSIS — Z88.0: ICD-10-CM

## 2023-04-13 DIAGNOSIS — F17.210: ICD-10-CM

## 2023-04-13 DIAGNOSIS — J44.9: ICD-10-CM

## 2023-04-13 PROCEDURE — A4663 DIALYSIS BLOOD PRESSURE CUFF: HCPCS

## 2024-08-19 NOTE — NUR
Discharge Summary - [facility]     Patient Information: Alonzo Watson 63 y.o. male MRN: [id number]  Unit/Bed#: [address] [place] [address] Encounter: [id number]     Admitting Physician: Jeanna Alexander MD  Discharging Physician/Practitioner: Kita Hurtado MD   PCP: MANAS Payne  Admission Date:   Admission Orders (From admission, onward)       Ordered        08/18/24 0718  INPATIENT ADMISSION  Once                          Discharge Date: 08/20/24      Reason for Admission: Shortness of breath [R06.02]  Cough [R05.9]  Visual hallucination [R44.1]  Weakness generalized [R53.1]  Lactic acid acidosis [E87.20]  DM hyperosmolarity type II, uncontrolled (HCC) [E11.00]  COPD (chronic obstructive pulmonary disease) with acute bronchitis  ([facility]) [J44.0, J20.9]     Discharge Diagnoses:      Active Problems:    Leukocytosis    Chronic low back pain    CLL (chronic lymphocytic leukemia) (HCC)    Urinary retention    Psychiatric disorder    Coronary artery disease    Essential hypertension    Diabetic neuropathy (HCC)    Chronic respiratory failure (HCC)    Type 2 diabetes mellitus with complication, with long-term current use of insulin (HCC)    Obstructive sleep apnea    Chronic a-fib (HCC)    Heart failure (HCC)    Chronic kidney disease, stage 2 (mild)    Chronic obstructive pulmonary disease, unspecified COPD type (HCC)    Hx of adenomatous colonic polyps    Muscle spasms of both lower extremities  Resolved Problems:    Visual hallucination       Consultations During Hospital Stay:  ·       Respiratory Therapy  ·       PT/OT  ·       Heme/Onc  ·       Urology     Procedures Performed:   ·       N/A     Significant Findings / Test Results:   8/20: WBC 23.41, K 4.3, Mg 1.9, Na 133  8/19: WBC 28.03, LA 1.4, K 4.0, Mg 2.0, A1c 11.2, Bcx2 - no growth art 24 hrs  8/18: WBC 33.54, Hg 15.2, Na 131, Na 131, Cl 92,gluc 302, BNP 55, proocal 0.11, LA 3.6, Trop 9>, VBG unremarkable     Echo: No  MS RN NOTES

AWAKE RESTLESS,TRYING TO GET OUT FROM BED,CLAIMED PAIN,MEDIVATED WITH NORCO 10/325MG,1 TAB 
PO AS ORDERED. major changes since last echo 2022  CT spine cervical: distended bladder  CT spine thoracic: Mildly prominent subcentimeter cervical lymph nodes, likely due to underlying chronic lymphocytic leukemia   CT brain: No acute intracranial abnormality  CXR: No acute cardiopulmonary disease. Stable right-sided volume loss.    Incidental Findings:   ·       N/A     Test Results Pending at Discharge (will require follow up):     [] Urine Culture    [] Sputum Culture       Outpatient Tests Requested:  ·       Trend WBC     Outpatient follow-up Requested:  ·       PCP  ·       [facility]   ·       Heme/Onc  ·       PT/OT      Complications:  N/A    Things to address at first visit after hospitalization   How is your back/neck pain?  Did you go to the spinal center?  Are you going to PT/OT?  Did you go for Urology follow-up?  How often are you using your Inhalers?  How are your blood sugars?      Hospital Course:   Alonzo Watson is a 62 y/o M w/ PMH CHF, CKD, SHAVONNE, HTN, DMII who presented to the ED on 8/18/24 with weakness and visual hallucinations associated with diffuse tenderness in neck and back. In the ED was given 1 IV dose of 2000 mg Rocephin for his increased WBC from baseline associated with cough.  Also given 10 U regular insulin, Tylenol, azithromycin, baclofen in the ED. Upon examination vitals were stable and lungs were clear with a nonproductive cough likely secondary to his chronic bronchitis. He was A&Ox3 and no longer had visual hallucinations. Head CT was negative. He complained of active diffuse pain in his neck and back for which spinal CT imaging found no new source. PT/OT recommend patient will benefit from outpatient therapy for improved ADL. His history of BPH and diabetes contributed to hypocontractile bladder urinary retention for which he was placed on a crocker to continue at discharge. Urology will see him outpatient for possible cystoscopy. Also recommended to follow up with primary doctor for poor  glycemic control.     Leukocytosis  Assessment & Plan  Patient with CLL and history of leukocytosis with cough and rhinorrhea.  Follows up with hematology-oncology outpatient. Last visit was in April where he agreed to begin IVIG, insurance does not cover.    -POA: WBC 33.54, LA 3.6.  -8/20: WBC 23.41  -8/19: LA 1.4    Follow up outpatient heme/onc for further CLL management plan   Start MUCINEX 600 mg twice a day as needed for worsening rhinorrhea.       Visual hallucination-resolved as of 8/19/2024  Assessment & Plan  New visual hallucinations onset, history of CLL, not currently receiving treatment. Increased neck pain and tenderness.      -LA 3.6  -WBC 33.54  -VBG negative     Head CT  Trend WBC and LA  Monitor for any mental status changes       Urinary retention  Assessment & Plan  Uncontrolled diabetes with Ha1c 11.2. 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     Continue crocker usage   Urology f/u outpatient for urodynamic flex cystoscopy   Continue Flomax 0.8 mg daily      CLL (chronic lymphocytic leukemia) (HCC)  Assessment & Plan  History of CLL, follows with hematology oncology.  Patient was open to beginning IVIG treatment in April, but in insurance does not cover.    -Echo 2024: EF 57%, no significant changes since last echo 2022    Follow up with heme/onc outpatient      Chronic low back pain  Assessment & Plan  Worsening diffuse tenderness along upper and lower back. Limited range of motion secondary to increased pain upon movement.     -Spinal CT (cervical, thoracic, lumbar): negative     Follow up with PT outpatient   Follow up for chronic pain at [facility] outpatient  Heating/cooling packs as needed  Use 2 Lidocaine patches 5% over 12 hours   Pain Management: oxyCODONE-acetaminophen (PERCOCET) 5-325 mg per tablet 2 tablet for moderate pain q8 PRN    Muscle spasms of both lower extremities  Assessment & Plan  Chronic    Continue home ZANAFLEX as needed      Chronic  obstructive pulmonary disease, unspecified COPD type ([facility])  Assessment & Plan  Chronic    Follows up with pulmonology.  Home medications include Trelegy daily, DuoNebs and Ventolin as needed.     Continue home medications    Chronic kidney disease, stage 2 (mild)  Assessment & Plan  Lab Results   Component Value Date    EGFR 62 08/18/2024    EGFR 100 05/20/2024    EGFR 96 04/15/2024    CREATININE 1.22 08/18/2024    CREATININE 0.69 05/20/2024    CREATININE 0.75 04/15/2024     Chronic, stable.     Continue to monitor  Avoid nephrotoxic agents    Heart failure ([facility])  Assessment & Plan  Wt Readings from Last 3 Encounters:   04/08/24 103 kg (228 lb)   02/27/24 108 kg (238 lb 1.6 oz)   02/06/24 108 kg (238 lb)     Chronic; stable.    Home medications includes spironolactone 25 mg daily, metoprolol 200 mg daily and Bumex 1 mg as needed.  Last echo of 2024 showed EF 57%.    Continue home medications.   Hospital course: spironolactone 25 mg daily & 1 time Bumex 1 mg given.       Chronic a-fib ([facility])  Assessment & Plan  Chronic, stable. At home on Digoxin 0.25mcg PO daily & Metoprolol 200mg PO QD & Eliquis 5 mg BID.    -EKG: a-fib, no changes     Continue home medications    Obstructive sleep apnea  Assessment & Plan  Chronic; uses BiPAP at bedtime.    Continue BiPAP use at bedtime    Type 2 diabetes mellitus with complication, with long-term current use of insulin ([facility])  Assessment & Plan  Lab Results   Component Value Date    HGBA1C 11.2 (H) 08/19/2024     Chronic, UNCONTROLLED. Prior A1c 9.7 on 4/2/24.    Home medication includes gabapentin 800 mg 4 times daily, glargine 50 units twice daily and lispro 15 units 3 times daily with meals.     ED: Glucose was 302 and received 10 units regular insulin    Continue home medications   Diabetic diet   ISS  Continue home gabapentin 800 mg 4 times daily    Chronic respiratory failure ([facility])  Assessment & Plan  Hx of COPD and chronic hypoxic respiratory failure, on 3 L of oxygen during  "the day and BiPAP with 3 L of oxygen at night.  Currently on baseline oxygen requirements.    Continue to monitor SpO2  Continue supplemental oxygen  Continue BiPAP nightly  Respiratory protocol    Diabetic neuropathy (HCC)  Assessment & Plan  Continue home gabapentin 800 mg 4 times daily.     Essential hypertension  Assessment & Plan  Chronic; stable    Home medication include Cozaar 50 mg daily and metoprolol 200 mg daily.     Continue home medication  Continue to monitor blood pressure    Coronary artery disease  Assessment & Plan  Chronic, stable.    Home medication includes metoprolol 200 daily. Lipitor and ASA?    Continue home medications    Psychiatric disorder  Assessment & Plan  History of bipolar dx, depression with anxiety.  Home regimen: BUSPAR 10mg BID, Zoloft 50mg daily, Seroquel 300 mg daily at bedtime.     Continue home medications           Condition at Discharge: stable      Discharge Day Visit / Exam:      Vitals: Blood Pressure: 134/76 (08/20/24 0835)  Pulse: 85 (08/20/24 0835)  Temperature: 98 °F (36.7 °C) (08/20/24 0835)  Temp Source: Temporal (08/20/24 0835)  Respirations: 20 (08/20/24 0835)  Height: 5' 11\" (180.3 cm) (08/19/24 0845)  Weight - Scale: 107 kg (234 lb 12.8 oz) (08/20/24 0835)  SpO2: 98 % (08/20/24 0854)  Exam:   Physical Exam  Constitutional:       General: He is not in acute distress.     Appearance: Normal appearance.   HENT:      Head: Normocephalic and atraumatic.      Nose: Congestion present. No rhinorrhea.      Mouth/Throat:      Mouth: Mucous membranes are moist.   Eyes:      Conjunctiva/sclera: Conjunctivae normal.   Cardiovascular:      Rate and Rhythm: Rhythm irregular.      Heart sounds: No murmur heard.  Pulmonary:      Effort: Pulmonary effort is normal.      Breath sounds: No rhonchi.   Abdominal:      General: Abdomen is flat. There is no distension.      Palpations: Abdomen is soft.      Tenderness: There is no abdominal tenderness.   Musculoskeletal:         " General: Tenderness (Neck and back tenderness) present. No swelling.   Skin:     General: Skin is warm and dry.   Neurological:      General: No focal deficit present.      Mental Status: He is alert and oriented to person, place, and time.      Cranial Nerves: No cranial nerve deficit.      Sensory: No sensory deficit.      Motor: No weakness.   Psychiatric:         Mood and Affect: Mood normal.         Behavior: Behavior normal.         Thought Content: Thought content normal.       Discussion with Family:   Patient Information Sharing: Alonzo Watson does not wish inpatient team to notify their loved ones of their condition and current plan.      Discharge instructions/Information to patient and family:   See after visit summary for information provided to patient and family.       Discharge Medications:     Medication List      CONTINUE taking these medications     Alcohol Prep 70 % Pads; Apply topically 4 (four) times a day As directed   * [place] Choice Comfort EZ 33G X 4 MM Misc; Generic drug: Insulin Pen   Needle   * [place] Choice Comfort EZ 33G X 4 MM Misc; Generic drug: Insulin Pen   Needle; USE TO INJECT INSULIN 5 TIMES A DAY   * B-D ULTRAFINE III SHORT PEN 31G X 8 MM Misc; Generic drug: Insulin Pen   Needle   * Unifine SafeControl Pen Needle 30G X 5 MM Misc; Generic drug: Insulin   Pen Needle; Inject under the skin 5 (five) times a day 5 times a day use   * Insulin Pen Needle 31G X 5 MM Misc; Inject under the skin 4 (four)   times a day   FreeStyle [place]ba 2 [place] Shante; Check blood sugars multiple times per day   FreeStyle Sheba 2 Sensor Misc; CHECK BLOOD SUGARS MULTIPLE TIMES DAILY  * This list has 5 medication(s) that are the same as other medications   prescribed for you. Read the directions carefully, and ask your doctor or   other care provider to review them with you.     ASK your doctor about these medications     acetaminophen 325 mg tablet; Commonly known as: TYLENOL; Take 2 tablets   (650 mg  total) by mouth every 6 (six) hours as needed for mild pain,   headaches or fever   apixaban 5 mg; Commonly known as: Eliquis; Take 1 tablet (5 mg total) by   mouth 2 (two) times a day   baclofen 10 mg tablet; Take 1 tablet (10 mg total) by mouth 3 (three)   times a day   bumetanide 1 mg tablet; Commonly known as: BUMEX; Take 1 tablet (1 mg   total) by mouth daily   busPIRone 10 mg tablet; Commonly known as: BUSPAR; TAKE ONE TABLET BY   MOUTH TWICE DAILY   CENTRUM SILVER 50+MEN PO   digoxin 0.25 mg tablet; Commonly known as: LANOXIN; TAKE 1 TABLET DAILY   docusate sodium 100 mg capsule; Commonly known as: COLACE; Take 1   capsule (100 mg total) by mouth 2 (two) times a day   gabapentin 800 mg tablet; Commonly known as: NEURONTIN; TAKE 1 TABLET BY   MOUTH 4 TIMES A DAY   HumaLOG KwikPen 200 units/mL CONCENTRATED U-200 injection pen; Generic   drug: insuln lispro; Inject 20 Units under the skin 3 (three) times a day   with meals   Insulin Glargine Solostar 100 UNIT/ML Sopn; Commonly known as: Lantus   SoloStar; Inject 0.6 mL (60 Units total) under the skin 2 (two) times a   day   ipratropium-albuterol 0.5-2.5 mg/3 mL nebulizer solution; Commonly known   as: DUO-NEB; Take 3 mL by nebulization every 6 (six) hours as needed for   wheezing or shortness of breath   lamoTRIgine 25 mg tablet; Commonly known as: LaMICtal   losartan 50 mg tablet; Commonly known as: COZAAR; Take 1 tablet (50 mg   total) by mouth daily   metoprolol succinate 200 MG 24 hr tablet; Commonly known as: TOPROL-XL;   Take 1 tablet (200 mg total) by mouth daily   naloxone 4 mg/0.1 mL nasal spray; Commonly known as: NARCAN; Administer   1 spray into a nostril. If no response after 2-3 minutes, give another   dose in the other nostril using a new spray.   oxyCODONE-acetaminophen 5-325 mg per tablet; Commonly known as:   Percocet; Take 2 tablets by mouth every 8 (eight) hours as needed for   moderate pain Max Daily Amount: 6 tablets   * QUEtiapine 300 mg  tablet; Commonly known as: SEROquel   * QUEtiapine 100 mg tablet; Commonly known as: SEROquel; Take 1 tablet   (100 mg total) by mouth daily at bedtime   sertraline 50 mg tablet; Commonly known as: ZOLOFT; Take 1 tablet (50 mg   total) by mouth daily Daily at bedtime   * sodium chloride 1 g tablet; TAKE 1 TABLET (1 G TOTAL) BY MOUTH EVERY   MORNING   * sodium chloride 1 g tablet; Take 1 tablet (1 g total) by mouth every   morning   spironolactone 25 mg tablet; Commonly known as: ALDACTONE; TAKE 1 TABLET   BY MOUTH DAILY   tiZANidine 4 mg tablet; Commonly known as: ZANAFLEX; Take 1 tablet (4 mg   total) by mouth every 8 (eight) hours as needed for muscle spasms   Trelegy Ellipta 100-62.5-25 MCG/ACT inhaler; Generic drug:   fluticasone-umeclidinium-vilanterol; Inhale 1 puff daily Rinse mouth after   use.   * Ventolin  (90 Base) MCG/ACT inhaler; Generic drug: albuterol;   INHALE 2 PUFFS EVERY 6 (SIX) HOURS AS NEEDED FOR WHEEZING   * albuterol (2.5 mg/3 mL) 0.083 % nebulizer solution; USE 1 VIAL (2.5 MG   TOTAL) BY NEBULIZATION EVERY 6 HOURS AS NEEDED FOR WHEEZING  * This list has 6 medication(s) that are the same as other medications   prescribed for you. Read the directions carefully, and ask your doctor or   other care provider to review them with you.        Disposition:   Home     For Discharges to [facility]:   ·       Not Applicable to this Patient - Not Applicable to this Patient     Discharge Statement:  I spent 30 minutes discharging the patient. This time was spent on the day of discharge. I had direct contact with the patient on the day of discharge. Greater than 50% of the total time was spent examining patient, answering all patient questions, arranging and discussing plan of care with patient as well as directly providing post-discharge instructions.  Additional time then spent on discharge activities.     ** Please Note: This note has been constructed using a voice recognition  system **     Napoleon Rey MD   08/20/24  12:18 PM